# Patient Record
Sex: FEMALE | Race: WHITE | NOT HISPANIC OR LATINO | URBAN - METROPOLITAN AREA
[De-identification: names, ages, dates, MRNs, and addresses within clinical notes are randomized per-mention and may not be internally consistent; named-entity substitution may affect disease eponyms.]

---

## 2017-09-29 ENCOUNTER — OUTPATIENT (OUTPATIENT)
Dept: INPATIENT UNIT | Facility: HOSPITAL | Age: 32
LOS: 1 days | End: 2017-09-29
Payer: COMMERCIAL

## 2017-09-29 DIAGNOSIS — O47.03 FALSE LABOR BEFORE 37 COMPLETED WEEKS OF GESTATION, THIRD TRIMESTER: ICD-10-CM

## 2017-09-29 LAB
ALBUMIN SERPL ELPH-MCNC: 3.6 G/DL — SIGNIFICANT CHANGE UP (ref 3.3–5.2)
ALP SERPL-CCNC: 80 U/L — SIGNIFICANT CHANGE UP (ref 40–120)
ALT FLD-CCNC: 12 U/L — SIGNIFICANT CHANGE UP
ANION GAP SERPL CALC-SCNC: 16 MMOL/L — SIGNIFICANT CHANGE UP (ref 5–17)
APPEARANCE UR: CLEAR — SIGNIFICANT CHANGE UP
APTT BLD: 26.5 SEC — LOW (ref 27.5–37.4)
AST SERPL-CCNC: 17 U/L — SIGNIFICANT CHANGE UP
BASOPHILS # BLD AUTO: 0 K/UL — SIGNIFICANT CHANGE UP (ref 0–0.2)
BASOPHILS NFR BLD AUTO: 0.2 % — SIGNIFICANT CHANGE UP (ref 0–2)
BILIRUB SERPL-MCNC: 0.1 MG/DL — LOW (ref 0.4–2)
BILIRUB UR-MCNC: NEGATIVE — SIGNIFICANT CHANGE UP
BUN SERPL-MCNC: 10 MG/DL — SIGNIFICANT CHANGE UP (ref 8–20)
CALCIUM SERPL-MCNC: 8.5 MG/DL — LOW (ref 8.6–10.2)
CHLORIDE SERPL-SCNC: 102 MMOL/L — SIGNIFICANT CHANGE UP (ref 98–107)
CO2 SERPL-SCNC: 21 MMOL/L — LOW (ref 22–29)
COLOR SPEC: YELLOW — SIGNIFICANT CHANGE UP
CREAT SERPL-MCNC: 0.83 MG/DL — SIGNIFICANT CHANGE UP (ref 0.5–1.3)
DIFF PNL FLD: NEGATIVE — SIGNIFICANT CHANGE UP
EOSINOPHIL # BLD AUTO: 0.1 K/UL — SIGNIFICANT CHANGE UP (ref 0–0.5)
EOSINOPHIL NFR BLD AUTO: 1 % — SIGNIFICANT CHANGE UP (ref 0–6)
EPI CELLS # UR: SIGNIFICANT CHANGE UP
FIBRINOGEN PPP-MCNC: 695 MG/DL — HIGH (ref 310–510)
GLUCOSE SERPL-MCNC: 120 MG/DL — HIGH (ref 70–115)
GLUCOSE UR QL: 50 MG/DL
HCT VFR BLD CALC: 30.3 % — LOW (ref 37–47)
HGB BLD-MCNC: 10.5 G/DL — LOW (ref 12–16)
INR BLD: 0.91 RATIO — SIGNIFICANT CHANGE UP (ref 0.88–1.16)
KETONES UR-MCNC: ABNORMAL
LEUKOCYTE ESTERASE UR-ACNC: ABNORMAL
LYMPHOCYTES # BLD AUTO: 1.9 K/UL — SIGNIFICANT CHANGE UP (ref 1–4.8)
LYMPHOCYTES # BLD AUTO: 17.2 % — LOW (ref 20–55)
MCHC RBC-ENTMCNC: 30.4 PG — SIGNIFICANT CHANGE UP (ref 27–31)
MCHC RBC-ENTMCNC: 34.7 G/DL — SIGNIFICANT CHANGE UP (ref 32–36)
MCV RBC AUTO: 87.8 FL — SIGNIFICANT CHANGE UP (ref 81–99)
MONOCYTES # BLD AUTO: 0.9 K/UL — HIGH (ref 0–0.8)
MONOCYTES NFR BLD AUTO: 8.1 % — SIGNIFICANT CHANGE UP (ref 3–10)
NEUTROPHILS # BLD AUTO: 7.9 K/UL — SIGNIFICANT CHANGE UP (ref 1.8–8)
NEUTROPHILS NFR BLD AUTO: 72.9 % — SIGNIFICANT CHANGE UP (ref 37–73)
NITRITE UR-MCNC: NEGATIVE — SIGNIFICANT CHANGE UP
PH UR: 6 — SIGNIFICANT CHANGE UP (ref 5–8)
PLATELET # BLD AUTO: 208 K/UL — SIGNIFICANT CHANGE UP (ref 150–400)
POTASSIUM SERPL-MCNC: 3.5 MMOL/L — SIGNIFICANT CHANGE UP (ref 3.5–5.3)
POTASSIUM SERPL-SCNC: 3.5 MMOL/L — SIGNIFICANT CHANGE UP (ref 3.5–5.3)
PROT SERPL-MCNC: 6.6 G/DL — SIGNIFICANT CHANGE UP (ref 6.6–8.7)
PROT UR-MCNC: 30 MG/DL
PROTHROM AB SERPL-ACNC: 10 SEC — SIGNIFICANT CHANGE UP (ref 9.8–12.7)
RBC # BLD: 3.45 M/UL — LOW (ref 4.4–5.2)
RBC # FLD: 13.3 % — SIGNIFICANT CHANGE UP (ref 11–15.6)
RBC CASTS # UR COMP ASSIST: NEGATIVE /HPF — SIGNIFICANT CHANGE UP (ref 0–4)
SODIUM SERPL-SCNC: 139 MMOL/L — SIGNIFICANT CHANGE UP (ref 135–145)
SP GR SPEC: 1.02 — SIGNIFICANT CHANGE UP (ref 1.01–1.02)
URATE SERPL-MCNC: 4.6 MG/DL — SIGNIFICANT CHANGE UP (ref 2.4–5.7)
UROBILINOGEN FLD QL: 1 MG/DL
WBC # BLD: 10.9 K/UL — HIGH (ref 4.8–10.8)
WBC # FLD AUTO: 10.9 K/UL — HIGH (ref 4.8–10.8)
WBC UR QL: SIGNIFICANT CHANGE UP

## 2017-09-29 PROCEDURE — 85730 THROMBOPLASTIN TIME PARTIAL: CPT

## 2017-09-29 PROCEDURE — 81001 URINALYSIS AUTO W/SCOPE: CPT

## 2017-09-29 PROCEDURE — 85027 COMPLETE CBC AUTOMATED: CPT

## 2017-09-29 PROCEDURE — 85610 PROTHROMBIN TIME: CPT

## 2017-09-29 PROCEDURE — 85384 FIBRINOGEN ACTIVITY: CPT

## 2017-09-29 PROCEDURE — 59025 FETAL NON-STRESS TEST: CPT

## 2017-09-29 PROCEDURE — G0463: CPT

## 2017-09-29 PROCEDURE — 36415 COLL VENOUS BLD VENIPUNCTURE: CPT

## 2017-09-29 PROCEDURE — 80053 COMPREHEN METABOLIC PANEL: CPT

## 2017-09-29 PROCEDURE — 84550 ASSAY OF BLOOD/URIC ACID: CPT

## 2017-09-29 RX ORDER — SODIUM CHLORIDE 9 MG/ML
500 INJECTION, SOLUTION INTRAVENOUS ONCE
Qty: 0 | Refills: 0 | Status: COMPLETED | OUTPATIENT
Start: 2017-09-29 | End: 2017-09-29

## 2017-09-29 RX ADMIN — SODIUM CHLORIDE 1000 MILLILITER(S): 9 INJECTION, SOLUTION INTRAVENOUS at 20:07

## 2017-11-24 ENCOUNTER — INPATIENT (INPATIENT)
Facility: HOSPITAL | Age: 32
LOS: 3 days | Discharge: HOME | End: 2017-11-28
Payer: COMMERCIAL

## 2017-11-24 ENCOUNTER — TRANSCRIPTION ENCOUNTER (OUTPATIENT)
Age: 32
End: 2017-11-24

## 2017-11-24 VITALS — HEIGHT: 66 IN | WEIGHT: 180.78 LBS

## 2017-11-24 LAB
ABO RH CONFIRMATION: SIGNIFICANT CHANGE UP
APPEARANCE UR: CLEAR — SIGNIFICANT CHANGE UP
BASOPHILS # BLD AUTO: 0 K/UL — SIGNIFICANT CHANGE UP (ref 0–0.2)
BASOPHILS NFR BLD AUTO: 0.2 % — SIGNIFICANT CHANGE UP (ref 0–2)
BILIRUB UR-MCNC: NEGATIVE — SIGNIFICANT CHANGE UP
BLD GP AB SCN SERPL QL: SIGNIFICANT CHANGE UP
COLOR SPEC: YELLOW — SIGNIFICANT CHANGE UP
DIFF PNL FLD: NEGATIVE — SIGNIFICANT CHANGE UP
EOSINOPHIL # BLD AUTO: 0.1 K/UL — SIGNIFICANT CHANGE UP (ref 0–0.5)
EOSINOPHIL NFR BLD AUTO: 1.2 % — SIGNIFICANT CHANGE UP (ref 0–6)
GLUCOSE UR QL: NEGATIVE MG/DL — SIGNIFICANT CHANGE UP
HCT VFR BLD CALC: 33.2 % — LOW (ref 37–47)
HGB BLD-MCNC: 11.5 G/DL — LOW (ref 12–16)
KETONES UR-MCNC: NEGATIVE — SIGNIFICANT CHANGE UP
LEUKOCYTE ESTERASE UR-ACNC: NEGATIVE — SIGNIFICANT CHANGE UP
LYMPHOCYTES # BLD AUTO: 1.8 K/UL — SIGNIFICANT CHANGE UP (ref 1–4.8)
LYMPHOCYTES # BLD AUTO: 20.2 % — SIGNIFICANT CHANGE UP (ref 20–55)
MCHC RBC-ENTMCNC: 30.6 PG — SIGNIFICANT CHANGE UP (ref 27–31)
MCHC RBC-ENTMCNC: 34.6 G/DL — SIGNIFICANT CHANGE UP (ref 32–36)
MCV RBC AUTO: 88.3 FL — SIGNIFICANT CHANGE UP (ref 81–99)
MONOCYTES # BLD AUTO: 0.6 K/UL — SIGNIFICANT CHANGE UP (ref 0–0.8)
MONOCYTES NFR BLD AUTO: 7.1 % — SIGNIFICANT CHANGE UP (ref 3–10)
NEUTROPHILS # BLD AUTO: 6.3 K/UL — SIGNIFICANT CHANGE UP (ref 1.8–8)
NEUTROPHILS NFR BLD AUTO: 71 % — SIGNIFICANT CHANGE UP (ref 37–73)
NITRITE UR-MCNC: NEGATIVE — SIGNIFICANT CHANGE UP
PH UR: 7 — SIGNIFICANT CHANGE UP (ref 5–8)
PLATELET # BLD AUTO: 203 K/UL — SIGNIFICANT CHANGE UP (ref 150–400)
PROT UR-MCNC: NEGATIVE MG/DL — SIGNIFICANT CHANGE UP
RBC # BLD: 3.76 M/UL — LOW (ref 4.4–5.2)
RBC # FLD: 13.1 % — SIGNIFICANT CHANGE UP (ref 11–15.6)
SP GR SPEC: 1 — LOW (ref 1.01–1.02)
TYPE + AB SCN PNL BLD: SIGNIFICANT CHANGE UP
UROBILINOGEN FLD QL: NEGATIVE MG/DL — SIGNIFICANT CHANGE UP
WBC # BLD: 8.9 K/UL — SIGNIFICANT CHANGE UP (ref 4.8–10.8)
WBC # FLD AUTO: 8.9 K/UL — SIGNIFICANT CHANGE UP (ref 4.8–10.8)

## 2017-11-24 RX ORDER — SODIUM CHLORIDE 9 MG/ML
1000 INJECTION, SOLUTION INTRAVENOUS
Qty: 0 | Refills: 0 | Status: DISCONTINUED | OUTPATIENT
Start: 2017-11-24 | End: 2017-11-25

## 2017-11-24 RX ORDER — OXYTOCIN 10 UNIT/ML
333.33 VIAL (ML) INJECTION
Qty: 20 | Refills: 0 | Status: DISCONTINUED | OUTPATIENT
Start: 2017-11-24 | End: 2017-11-25

## 2017-11-24 RX ORDER — CITRIC ACID/SODIUM CITRATE 300-500 MG
30 SOLUTION, ORAL ORAL ONCE
Qty: 0 | Refills: 0 | Status: COMPLETED | OUTPATIENT
Start: 2017-11-24 | End: 2017-11-25

## 2017-11-24 RX ORDER — INFLUENZA VIRUS VACCINE 15; 15; 15; 15 UG/.5ML; UG/.5ML; UG/.5ML; UG/.5ML
0.5 SUSPENSION INTRAMUSCULAR ONCE
Qty: 0 | Refills: 0 | Status: COMPLETED | OUTPATIENT
Start: 2017-11-24 | End: 2017-11-27

## 2017-11-24 RX ORDER — MEPERIDINE HYDROCHLORIDE 50 MG/ML
75 INJECTION INTRAMUSCULAR; INTRAVENOUS; SUBCUTANEOUS EVERY 4 HOURS
Qty: 0 | Refills: 0 | Status: DISCONTINUED | OUTPATIENT
Start: 2017-11-24 | End: 2017-11-25

## 2017-11-24 RX ORDER — SODIUM CHLORIDE 9 MG/ML
1500 INJECTION, SOLUTION INTRAVENOUS ONCE
Qty: 0 | Refills: 0 | Status: COMPLETED | OUTPATIENT
Start: 2017-11-24 | End: 2017-11-25

## 2017-11-24 RX ADMIN — SODIUM CHLORIDE 125 MILLILITER(S): 9 INJECTION, SOLUTION INTRAVENOUS at 18:20

## 2017-11-25 ENCOUNTER — TRANSCRIPTION ENCOUNTER (OUTPATIENT)
Age: 32
End: 2017-11-25

## 2017-11-25 LAB — DIR ANTIGLOB POLYSPECIFIC INTERPRETATION: SIGNIFICANT CHANGE UP

## 2017-11-25 RX ORDER — SODIUM CHLORIDE 9 MG/ML
1000 INJECTION, SOLUTION INTRAVENOUS
Qty: 0 | Refills: 0 | Status: DISCONTINUED | OUTPATIENT
Start: 2017-11-25 | End: 2017-11-28

## 2017-11-25 RX ORDER — OXYCODONE AND ACETAMINOPHEN 5; 325 MG/1; MG/1
1 TABLET ORAL EVERY 4 HOURS
Qty: 0 | Refills: 0 | Status: DISCONTINUED | OUTPATIENT
Start: 2017-11-25 | End: 2017-11-28

## 2017-11-25 RX ORDER — OXYCODONE HYDROCHLORIDE 5 MG/1
5 TABLET ORAL
Qty: 0 | Refills: 0 | Status: DISCONTINUED | OUTPATIENT
Start: 2017-11-25 | End: 2017-11-28

## 2017-11-25 RX ORDER — ONDANSETRON 8 MG/1
4 TABLET, FILM COATED ORAL EVERY 6 HOURS
Qty: 0 | Refills: 0 | Status: DISCONTINUED | OUTPATIENT
Start: 2017-11-25 | End: 2017-11-28

## 2017-11-25 RX ORDER — DIPHENHYDRAMINE HCL 50 MG
25 CAPSULE ORAL EVERY 6 HOURS
Qty: 0 | Refills: 0 | Status: DISCONTINUED | OUTPATIENT
Start: 2017-11-25 | End: 2017-11-28

## 2017-11-25 RX ORDER — DIPHENHYDRAMINE HCL 50 MG
50 CAPSULE ORAL EVERY 4 HOURS
Qty: 0 | Refills: 0 | Status: DISCONTINUED | OUTPATIENT
Start: 2017-11-25 | End: 2017-11-28

## 2017-11-25 RX ORDER — KETOROLAC TROMETHAMINE 30 MG/ML
30 SYRINGE (ML) INJECTION ONCE
Qty: 0 | Refills: 0 | Status: DISCONTINUED | OUTPATIENT
Start: 2017-11-25 | End: 2017-11-28

## 2017-11-25 RX ORDER — LANOLIN
1 OINTMENT (GRAM) TOPICAL
Qty: 0 | Refills: 0 | Status: DISCONTINUED | OUTPATIENT
Start: 2017-11-25 | End: 2017-11-28

## 2017-11-25 RX ORDER — KETOROLAC TROMETHAMINE 30 MG/ML
30 SYRINGE (ML) INJECTION EVERY 6 HOURS
Qty: 0 | Refills: 0 | Status: DISCONTINUED | OUTPATIENT
Start: 2017-11-25 | End: 2017-11-28

## 2017-11-25 RX ORDER — NALOXONE HYDROCHLORIDE 4 MG/.1ML
0.1 SPRAY NASAL
Qty: 0 | Refills: 0 | Status: DISCONTINUED | OUTPATIENT
Start: 2017-11-25 | End: 2017-11-28

## 2017-11-25 RX ORDER — SIMETHICONE 80 MG/1
80 TABLET, CHEWABLE ORAL
Qty: 0 | Refills: 0 | Status: DISCONTINUED | OUTPATIENT
Start: 2017-11-25 | End: 2017-11-28

## 2017-11-25 RX ORDER — OXYTOCIN 10 UNIT/ML
41.67 VIAL (ML) INJECTION
Qty: 20 | Refills: 0 | Status: DISCONTINUED | OUTPATIENT
Start: 2017-11-25 | End: 2017-11-28

## 2017-11-25 RX ORDER — FERROUS SULFATE 325(65) MG
325 TABLET ORAL DAILY
Qty: 0 | Refills: 0 | Status: DISCONTINUED | OUTPATIENT
Start: 2017-11-25 | End: 2017-11-28

## 2017-11-25 RX ORDER — ACETAMINOPHEN 500 MG
1000 TABLET ORAL ONCE
Qty: 0 | Refills: 0 | Status: DISCONTINUED | OUTPATIENT
Start: 2017-11-25 | End: 2017-11-28

## 2017-11-25 RX ORDER — OXYCODONE HYDROCHLORIDE 5 MG/1
10 TABLET ORAL
Qty: 0 | Refills: 0 | Status: DISCONTINUED | OUTPATIENT
Start: 2017-11-25 | End: 2017-11-28

## 2017-11-25 RX ORDER — CEFAZOLIN SODIUM 1 G
2000 VIAL (EA) INJECTION ONCE
Qty: 0 | Refills: 0 | Status: COMPLETED | OUTPATIENT
Start: 2017-11-25 | End: 2017-11-25

## 2017-11-25 RX ORDER — OXYTOCIN 10 UNIT/ML
41.67 VIAL (ML) INJECTION
Qty: 20 | Refills: 0 | Status: DISCONTINUED | OUTPATIENT
Start: 2017-11-25 | End: 2017-11-25

## 2017-11-25 RX ORDER — DOCUSATE SODIUM 100 MG
100 CAPSULE ORAL
Qty: 0 | Refills: 0 | Status: DISCONTINUED | OUTPATIENT
Start: 2017-11-25 | End: 2017-11-28

## 2017-11-25 RX ORDER — OXYTOCIN 10 UNIT/ML
2 VIAL (ML) INJECTION
Qty: 30 | Refills: 0 | Status: DISCONTINUED | OUTPATIENT
Start: 2017-11-25 | End: 2017-11-28

## 2017-11-25 RX ORDER — TETANUS TOXOID, REDUCED DIPHTHERIA TOXOID AND ACELLULAR PERTUSSIS VACCINE, ADSORBED 5; 2.5; 8; 8; 2.5 [IU]/.5ML; [IU]/.5ML; UG/.5ML; UG/.5ML; UG/.5ML
0.5 SUSPENSION INTRAMUSCULAR ONCE
Qty: 0 | Refills: 0 | Status: COMPLETED | OUTPATIENT
Start: 2017-11-25 | End: 2018-10-24

## 2017-11-25 RX ORDER — OXYCODONE AND ACETAMINOPHEN 5; 325 MG/1; MG/1
2 TABLET ORAL EVERY 6 HOURS
Qty: 0 | Refills: 0 | Status: DISCONTINUED | OUTPATIENT
Start: 2017-11-25 | End: 2017-11-28

## 2017-11-25 RX ORDER — CEFAZOLIN SODIUM 1 G
2000 VIAL (EA) INJECTION EVERY 8 HOURS
Qty: 0 | Refills: 0 | Status: COMPLETED | OUTPATIENT
Start: 2017-11-25 | End: 2017-11-26

## 2017-11-25 RX ORDER — HYDROMORPHONE HYDROCHLORIDE 2 MG/ML
1 INJECTION INTRAMUSCULAR; INTRAVENOUS; SUBCUTANEOUS
Qty: 0 | Refills: 0 | Status: DISCONTINUED | OUTPATIENT
Start: 2017-11-25 | End: 2017-11-28

## 2017-11-25 RX ORDER — DIPHENHYDRAMINE HCL 50 MG
50 CAPSULE ORAL ONCE
Qty: 0 | Refills: 0 | Status: DISCONTINUED | OUTPATIENT
Start: 2017-11-25 | End: 2017-11-28

## 2017-11-25 RX ORDER — KETOROLAC TROMETHAMINE 30 MG/ML
30 SYRINGE (ML) INJECTION EVERY 6 HOURS
Qty: 0 | Refills: 0 | Status: DISCONTINUED | OUTPATIENT
Start: 2017-11-25 | End: 2017-11-26

## 2017-11-25 RX ORDER — OXYTOCIN 10 UNIT/ML
333.33 VIAL (ML) INJECTION
Qty: 20 | Refills: 0 | Status: COMPLETED | OUTPATIENT
Start: 2017-11-25 | End: 2017-11-25

## 2017-11-25 RX ORDER — ACETAMINOPHEN 500 MG
650 TABLET ORAL EVERY 6 HOURS
Qty: 0 | Refills: 0 | Status: DISCONTINUED | OUTPATIENT
Start: 2017-11-25 | End: 2017-11-28

## 2017-11-25 RX ORDER — ONDANSETRON 8 MG/1
4 TABLET, FILM COATED ORAL ONCE
Qty: 0 | Refills: 0 | Status: DISCONTINUED | OUTPATIENT
Start: 2017-11-25 | End: 2017-11-28

## 2017-11-25 RX ADMIN — Medication 100 MILLIGRAM(S): at 15:30

## 2017-11-25 RX ADMIN — Medication 1000 MILLIUNIT(S)/MIN: at 15:51

## 2017-11-25 RX ADMIN — Medication 30 MILLIGRAM(S): at 20:57

## 2017-11-25 RX ADMIN — Medication 2 MILLIUNIT(S)/MIN: at 08:55

## 2017-11-25 RX ADMIN — Medication 100 MILLIGRAM(S): at 22:59

## 2017-11-25 RX ADMIN — SODIUM CHLORIDE 125 MILLILITER(S): 9 INJECTION, SOLUTION INTRAVENOUS at 23:00

## 2017-11-25 RX ADMIN — Medication 30 MILLILITER(S): at 11:52

## 2017-11-25 RX ADMIN — Medication 1000 MILLIUNIT(S)/MIN: at 20:57

## 2017-11-25 RX ADMIN — SODIUM CHLORIDE 3000 MILLILITER(S): 9 INJECTION, SOLUTION INTRAVENOUS at 11:40

## 2017-11-25 RX ADMIN — Medication 30 MILLIGRAM(S): at 21:27

## 2017-11-25 NOTE — DISCHARGE NOTE OB - CARE PLAN
Principal Discharge DX:	 delivery delivered  Goal:	full recovery  Instructions for follow-up, activity and diet:	F/U in office at end of week for staple removal, reg diet, pelvic rest

## 2017-11-25 NOTE — DISCHARGE NOTE OB - PATIENT PORTAL LINK FT
“You can access the FollowHealth Patient Portal, offered by [facility], by registering with the following website: [email or web address]”

## 2017-11-25 NOTE — DISCHARGE NOTE OB - MATERIALS PROVIDED
Shaken Baby Prevention Handout/Breastfeeding Guide and Packet/Breastfeeding Log/Tdap Vaccination (VIS Pub Date: January 24, 2012)/Vaccinations/[place] Hearing Screen Program/Birth Certificate Instructions/Breastfeeding Mother’s Support Group Information/Guide to Postpartum Care

## 2017-11-25 NOTE — DISCHARGE NOTE OB - HOSPITAL COURSE
pt was admitted for 41 wk induction and did not progress with cervical dilation despite several hours of regular, painful contractions - primary C/S performed.  C/S was uncomplicated as well as post op course - pt cleared for D/C home POD#3

## 2017-11-25 NOTE — DISCHARGE NOTE OB - CARE PROVIDER_API CALL
Ryanne Sung), Obstetrics and Gynecology  [address] [place] [address]  Phone: [phone]  Fax: [phone]

## 2017-11-25 NOTE — DISCHARGE NOTE OB - MEDICATION SUMMARY - MEDICATIONS TO TAKE
I will START or STAY ON the medications listed below when I get home from the hospital:     mg oral tablet  -- 1 tab(s) by mouth every 6 hours, As Needed -for moderate pain MDD:4 tabs  -- Do not take this drug if you are pregnant.  It is very important that you take or use this exactly as directed.  Do not skip doses or discontinue unless directed by your doctor.  May cause drowsiness or dizziness.  Obtain medical advice before taking any non-prescription drugs as some may affect the action of this medication.  Take with food or milk.    -- Indication: For prn moderate pain    Percocet 5/325 oral tablet  -- 1 tab(s) by mouth every 6 hours, As Needed -for severe pain MDD:4 tabs   -- Caution federal law prohibits the transfer of this drug to any person other  than the person for whom it was prescribed.  May cause drowsiness.  Alcohol may intensify this effect.  Use care when operating dangerous machinery.  This prescription cannot be refilled.  This product contains acetaminophen.  Do not use  with any other product containing acetaminophen to prevent possible liver damage.  Using more of this medication than prescribed may cause serious breathing problems.    -- Indication: For prn severe pain

## 2017-11-26 LAB
BASOPHILS # BLD AUTO: 0 K/UL — SIGNIFICANT CHANGE UP (ref 0–0.2)
BASOPHILS NFR BLD AUTO: 0.1 % — SIGNIFICANT CHANGE UP (ref 0–2)
EOSINOPHIL # BLD AUTO: 0 K/UL — SIGNIFICANT CHANGE UP (ref 0–0.5)
EOSINOPHIL NFR BLD AUTO: 0 % — SIGNIFICANT CHANGE UP (ref 0–6)
HCT VFR BLD CALC: 24.7 % — LOW (ref 37–47)
HGB BLD-MCNC: 8.6 G/DL — LOW (ref 12–16)
LYMPHOCYTES # BLD AUTO: 1.2 K/UL — SIGNIFICANT CHANGE UP (ref 1–4.8)
LYMPHOCYTES # BLD AUTO: 8.3 % — LOW (ref 20–55)
MCHC RBC-ENTMCNC: 30.6 PG — SIGNIFICANT CHANGE UP (ref 27–31)
MCHC RBC-ENTMCNC: 34.8 G/DL — SIGNIFICANT CHANGE UP (ref 32–36)
MCV RBC AUTO: 87.9 FL — SIGNIFICANT CHANGE UP (ref 81–99)
MONOCYTES # BLD AUTO: 1.4 K/UL — HIGH (ref 0–0.8)
MONOCYTES NFR BLD AUTO: 9.2 % — SIGNIFICANT CHANGE UP (ref 3–10)
NEUTROPHILS # BLD AUTO: 12.3 K/UL — HIGH (ref 1.8–8)
NEUTROPHILS NFR BLD AUTO: 82.2 % — HIGH (ref 37–73)
PLATELET # BLD AUTO: 156 K/UL — SIGNIFICANT CHANGE UP (ref 150–400)
RBC # BLD: 2.81 M/UL — LOW (ref 4.4–5.2)
RBC # FLD: 12.9 % — SIGNIFICANT CHANGE UP (ref 11–15.6)
T PALLIDUM AB TITR SER: NEGATIVE — SIGNIFICANT CHANGE UP
WBC # BLD: 15 K/UL — HIGH (ref 4.8–10.8)
WBC # FLD AUTO: 15 K/UL — HIGH (ref 4.8–10.8)

## 2017-11-26 RX ORDER — IBUPROFEN 200 MG
600 TABLET ORAL EVERY 6 HOURS
Qty: 0 | Refills: 0 | Status: DISCONTINUED | OUTPATIENT
Start: 2017-11-26 | End: 2017-11-28

## 2017-11-26 RX ADMIN — SIMETHICONE 80 MILLIGRAM(S): 80 TABLET, CHEWABLE ORAL at 06:46

## 2017-11-26 RX ADMIN — Medication 30 MILLIGRAM(S): at 15:26

## 2017-11-26 RX ADMIN — Medication 30 MILLIGRAM(S): at 09:27

## 2017-11-26 RX ADMIN — SIMETHICONE 80 MILLIGRAM(S): 80 TABLET, CHEWABLE ORAL at 17:30

## 2017-11-26 RX ADMIN — Medication 100 MILLIGRAM(S): at 17:30

## 2017-11-26 RX ADMIN — Medication 100 MILLIGRAM(S): at 06:27

## 2017-11-26 RX ADMIN — Medication 30 MILLIGRAM(S): at 03:16

## 2017-11-26 RX ADMIN — Medication 30 MILLIGRAM(S): at 09:12

## 2017-11-26 RX ADMIN — Medication 325 MILLIGRAM(S): at 09:13

## 2017-11-26 RX ADMIN — Medication 30 MILLIGRAM(S): at 15:11

## 2017-11-26 RX ADMIN — SODIUM CHLORIDE 125 MILLILITER(S): 9 INJECTION, SOLUTION INTRAVENOUS at 06:27

## 2017-11-26 RX ADMIN — Medication 1 TABLET(S): at 09:13

## 2017-11-26 RX ADMIN — Medication 30 MILLIGRAM(S): at 03:46

## 2017-11-26 NOTE — PROGRESS NOTE ADULT - SUBJECTIVE AND OBJECTIVE BOX
Section, POD#1    Pt is now POD#1 S/P  Section due to failure to progress/arrest of dilatation doing well.    She is sitting up in bed, tolerating a reg diet, and denies N/V.  No C/O dizziness, no SELWYN.  +Breast feeding without difficulty.  On exam, pt appears well.  VSS, afebrile.    Vital Signs Last 24 Hrs  T(C): 36.9 (2017 03:00), Max: 36.9 (2017 16:30)  T(F): 98.5 (2017 03:00), Max: 98.5 (2017 03:00)  HR: 76 (2017 03:00) (59 - 103)  BP: 138/80 (2017 03:00) (106/38 - 140/63)  RR: 18 (2017 03:00) (16 - 24)  SpO2: 96% (2017 18:45) (96% - 100%)    Breasts soft, nontender, nonengorged.  Abdomen: Soft, nontender, nondistended , firm uterine fundus.  Incision clean, dry dressing  Pelvic: Normal lochia noted  Ext: No DVT tenderness    LABS:                        11.5   8.9   )-----------( 203      ( 2017 18:43 )             33.2       POD#1 S/P primary  stable.  Pt recovering well.  Signs and symptoms normal /  abnormal post op courses reviewed.  Plan to cont reg diet, OOB,  pain management as per anesthesia at this time.  Will change to Motrin/Percocet prn today.

## 2017-11-26 NOTE — PROGRESS NOTE ADULT - SUBJECTIVE AND OBJECTIVE BOX
INTERVAL HPI/OVERNIGHT EVENTS:  32y Female s/p c section under epidural anesthesia with duramorph for post op analgesia on     Vital Signs Last 24 Hrs  T(C): 36.8 (26 Nov 2017 08:13), Max: 36.9 (25 Nov 2017 16:30)  T(F): 98.2 (26 Nov 2017 08:13), Max: 98.5 (26 Nov 2017 03:00)  HR: 82 (26 Nov 2017 08:13) (59 - 103)  BP: 118/70 (26 Nov 2017 08:13) (106/38 - 140/63)  BP(mean): --  RR: 18 (26 Nov 2017 08:13) (16 - 24)  SpO2: 96% (25 Nov 2017 18:45) (96% - 100%)    Patient seen, doing well, no anesthetic complications or complaints noted or reported.  Pain is controlled.

## 2017-11-27 RX ORDER — TETANUS TOXOID, REDUCED DIPHTHERIA TOXOID AND ACELLULAR PERTUSSIS VACCINE, ADSORBED 5; 2.5; 8; 8; 2.5 [IU]/.5ML; [IU]/.5ML; UG/.5ML; UG/.5ML; UG/.5ML
0.5 SUSPENSION INTRAMUSCULAR ONCE
Qty: 0 | Refills: 0 | Status: COMPLETED | OUTPATIENT
Start: 2017-11-27 | End: 2017-11-27

## 2017-11-27 RX ADMIN — Medication 600 MILLIGRAM(S): at 18:18

## 2017-11-27 RX ADMIN — Medication 600 MILLIGRAM(S): at 18:51

## 2017-11-27 RX ADMIN — Medication 1 TABLET(S): at 12:22

## 2017-11-27 RX ADMIN — Medication 600 MILLIGRAM(S): at 23:57

## 2017-11-27 RX ADMIN — Medication 325 MILLIGRAM(S): at 12:22

## 2017-11-27 RX ADMIN — Medication 600 MILLIGRAM(S): at 02:16

## 2017-11-27 RX ADMIN — Medication 600 MILLIGRAM(S): at 01:16

## 2017-11-27 RX ADMIN — SIMETHICONE 80 MILLIGRAM(S): 80 TABLET, CHEWABLE ORAL at 06:07

## 2017-11-27 RX ADMIN — Medication 100 MILLIGRAM(S): at 18:18

## 2017-11-27 RX ADMIN — Medication 600 MILLIGRAM(S): at 06:35

## 2017-11-27 RX ADMIN — Medication 100 MILLIGRAM(S): at 06:06

## 2017-11-27 RX ADMIN — SIMETHICONE 80 MILLIGRAM(S): 80 TABLET, CHEWABLE ORAL at 18:18

## 2017-11-27 RX ADMIN — Medication 600 MILLIGRAM(S): at 12:22

## 2017-11-27 RX ADMIN — INFLUENZA VIRUS VACCINE 0.5 MILLILITER(S): 15; 15; 15; 15 SUSPENSION INTRAMUSCULAR at 06:33

## 2017-11-27 RX ADMIN — Medication 600 MILLIGRAM(S): at 13:00

## 2017-11-27 NOTE — PROGRESS NOTE ADULT - SUBJECTIVE AND OBJECTIVE BOX
Section, POD#2    Pt is now POD#2 S/P  Section doing well.    She is ambulating without difficulty, tolerating a reg diet, and denies N/V.  No C/O dizziness, no SELWYN.  +Voiding without difficulty.  +Breast feeding    On exam, pt appears well.  VSS, afebrile.    Vital Signs Last 24 Hrs  T(C): 36.9 (2017 07:43), Max: 36.9 (2017 07:43)  T(F): 98.4 (2017 07:43), Max: 98.4 (2017 07:43)  HR: 78 (2017 07:43) (78 - 91)  BP: 135/84 (2017 07:43) (125/85 - 135/84)  RR: 18 (2017 07:43) (18 - 18)    Breasts soft, nontender, nonengorged.  Abdomen: Soft, nontender, nondistended , firm uterine fundus.  Incision clean, dry, intact with staples.  Pelvic: Normal lochia noted  Ext: No DVT tenderness    LABS:                        8.6    15.0  )-----------( 156      ( 2017 07:25 )             24.7         POD#2 S/P primary  stable.  Pt recovering well.  Signs and symptoms normal /  abnormal post op courses reviewed.  Plan to cont reg diet, OOB.  Possible D/C tomorrow am.

## 2017-11-28 VITALS
RESPIRATION RATE: 18 BRPM | TEMPERATURE: 98 F | DIASTOLIC BLOOD PRESSURE: 75 MMHG | HEART RATE: 79 BPM | SYSTOLIC BLOOD PRESSURE: 127 MMHG

## 2017-11-28 PROCEDURE — 90715 TDAP VACCINE 7 YRS/> IM: CPT

## 2017-11-28 PROCEDURE — 90686 IIV4 VACC NO PRSV 0.5 ML IM: CPT

## 2017-11-28 PROCEDURE — 85027 COMPLETE CBC AUTOMATED: CPT

## 2017-11-28 PROCEDURE — 86880 COOMBS TEST DIRECT: CPT

## 2017-11-28 PROCEDURE — 86900 BLOOD TYPING SEROLOGIC ABO: CPT

## 2017-11-28 PROCEDURE — 36415 COLL VENOUS BLD VENIPUNCTURE: CPT

## 2017-11-28 PROCEDURE — 86901 BLOOD TYPING SEROLOGIC RH(D): CPT

## 2017-11-28 PROCEDURE — 90707 MMR VACCINE SC: CPT

## 2017-11-28 PROCEDURE — 86780 TREPONEMA PALLIDUM: CPT

## 2017-11-28 PROCEDURE — 81003 URINALYSIS AUTO W/O SCOPE: CPT

## 2017-11-28 PROCEDURE — 86850 RBC ANTIBODY SCREEN: CPT

## 2017-11-28 RX ORDER — IBUPROFEN 200 MG
1 TABLET ORAL
Qty: 30 | Refills: 0
Start: 2017-11-28

## 2017-11-28 RX ADMIN — Medication 600 MILLIGRAM(S): at 00:30

## 2017-11-28 RX ADMIN — Medication 325 MILLIGRAM(S): at 12:01

## 2017-11-28 RX ADMIN — SIMETHICONE 80 MILLIGRAM(S): 80 TABLET, CHEWABLE ORAL at 06:33

## 2017-11-28 RX ADMIN — Medication 600 MILLIGRAM(S): at 07:17

## 2017-11-28 RX ADMIN — TETANUS TOXOID, REDUCED DIPHTHERIA TOXOID AND ACELLULAR PERTUSSIS VACCINE, ADSORBED 0.5 MILLILITER(S): 5; 2.5; 8; 8; 2.5 SUSPENSION INTRAMUSCULAR at 06:33

## 2017-11-28 RX ADMIN — Medication 1 TABLET(S): at 12:01

## 2017-11-28 RX ADMIN — Medication 600 MILLIGRAM(S): at 06:33

## 2017-11-28 RX ADMIN — Medication 600 MILLIGRAM(S): at 12:01

## 2017-11-28 RX ADMIN — Medication 0.5 MILLILITER(S): at 12:57

## 2017-11-28 RX ADMIN — Medication 100 MILLIGRAM(S): at 06:33

## 2017-11-28 NOTE — PROGRESS NOTE ADULT - SUBJECTIVE AND OBJECTIVE BOX
Section, POD#3    Pt is now POD#3 S/P primary  Section doing well.    She is ambulating without difficulty, tolerating a reg diet, and denies N/V.  No C/O dizziness, no SELWYN.  +Voiding without difficulty.  +Breast feeding.      On exam, pt appears well.  VSS, afebrile.    Vital Signs Last 24 Hrs  T(C): 36.8 (2017 19:15), Max: 36.8 (2017 19:15)  T(F): 98.2 (2017 19:15), Max: 98.2 (2017 19:15)  HR: 79 (2017 19:15) (79 - 79)  BP: 133/84 (2017 19:15) (133/84 - 133/84)  RR: 18 (2017 19:15) (18 - 18)    Breasts soft, nontender, nonengorged.  Abdomen: Soft, nontender, nondistended , firm uterine fundus.  Incision clean, dry, intact with staples.  Pelvic: Normal lochia noted  Ext: No DVT tenderness      POD#3 S/P primary  stable. Pt recovering well.  Signs and symptoms normal /  abnormal post op courses reviewed.  OK for D/C home.  F/U in office later this week for staple removal.

## 2018-01-05 ENCOUNTER — RESULT REVIEW (OUTPATIENT)
Age: 33
End: 2018-01-05

## 2018-11-01 PROBLEM — Z00.00 ENCOUNTER FOR PREVENTIVE HEALTH EXAMINATION: Status: ACTIVE | Noted: 2018-11-01

## 2018-11-04 ENCOUNTER — RESULT REVIEW (OUTPATIENT)
Age: 33
End: 2018-11-04

## 2018-11-05 ENCOUNTER — APPOINTMENT (OUTPATIENT)
Dept: DERMATOLOGY | Facility: CLINIC | Age: 33
End: 2018-11-05
Payer: COMMERCIAL

## 2018-11-05 PROCEDURE — 11101 BIOPSY SKIN SUBQ&/MUCOUS MEMBRANE EA ADDL LESN: CPT

## 2018-11-05 PROCEDURE — 11100 BX SKIN SUBCUTANEOUS&/MUCOUS MEMBRANE 1 LESION: CPT

## 2018-11-05 PROCEDURE — 99203 OFFICE O/P NEW LOW 30 MIN: CPT | Mod: 25

## 2019-02-11 ENCOUNTER — RESULT REVIEW (OUTPATIENT)
Age: 34
End: 2019-02-11

## 2019-05-20 ENCOUNTER — TRANSCRIPTION ENCOUNTER (OUTPATIENT)
Age: 34
End: 2019-05-20

## 2019-09-17 ENCOUNTER — OUTPATIENT (OUTPATIENT)
Dept: OUTPATIENT SERVICES | Facility: HOSPITAL | Age: 34
LOS: 1 days | End: 2019-09-17
Payer: COMMERCIAL

## 2019-09-17 VITALS
WEIGHT: 152.12 LBS | HEIGHT: 67 IN | SYSTOLIC BLOOD PRESSURE: 122 MMHG | TEMPERATURE: 98 F | RESPIRATION RATE: 20 BRPM | DIASTOLIC BLOOD PRESSURE: 68 MMHG | HEART RATE: 80 BPM

## 2019-09-17 DIAGNOSIS — Z01.818 ENCOUNTER FOR OTHER PREPROCEDURAL EXAMINATION: ICD-10-CM

## 2019-09-17 LAB
ALBUMIN SERPL ELPH-MCNC: 3.8 G/DL — SIGNIFICANT CHANGE UP (ref 3.3–5.2)
ALP SERPL-CCNC: 127 U/L — HIGH (ref 40–120)
ALT FLD-CCNC: 8 U/L — SIGNIFICANT CHANGE UP
ANION GAP SERPL CALC-SCNC: 12 MMOL/L — SIGNIFICANT CHANGE UP (ref 5–17)
APTT BLD: 26.9 SEC — LOW (ref 27.5–36.3)
AST SERPL-CCNC: 18 U/L — SIGNIFICANT CHANGE UP
BASOPHILS # BLD AUTO: 0.03 K/UL — SIGNIFICANT CHANGE UP (ref 0–0.2)
BASOPHILS NFR BLD AUTO: 0.4 % — SIGNIFICANT CHANGE UP (ref 0–2)
BILIRUB SERPL-MCNC: 0.2 MG/DL — LOW (ref 0.4–2)
BLD GP AB SCN SERPL QL: SIGNIFICANT CHANGE UP
BUN SERPL-MCNC: 9 MG/DL — SIGNIFICANT CHANGE UP (ref 8–20)
CALCIUM SERPL-MCNC: 9.2 MG/DL — SIGNIFICANT CHANGE UP (ref 8.6–10.2)
CHLORIDE SERPL-SCNC: 104 MMOL/L — SIGNIFICANT CHANGE UP (ref 98–107)
CO2 SERPL-SCNC: 21 MMOL/L — LOW (ref 22–29)
CREAT SERPL-MCNC: 0.49 MG/DL — LOW (ref 0.5–1.3)
EOSINOPHIL # BLD AUTO: 0.12 K/UL — SIGNIFICANT CHANGE UP (ref 0–0.5)
EOSINOPHIL NFR BLD AUTO: 1.6 % — SIGNIFICANT CHANGE UP (ref 0–6)
GLUCOSE SERPL-MCNC: 78 MG/DL — SIGNIFICANT CHANGE UP (ref 70–115)
HCT VFR BLD CALC: 34.2 % — LOW (ref 34.5–45)
HGB BLD-MCNC: 11.5 G/DL — SIGNIFICANT CHANGE UP (ref 11.5–15.5)
HIV 1 & 2 AB SERPL IA.RAPID: SIGNIFICANT CHANGE UP
HIV 1+2 AB+HIV1 P24 AG SERPL QL IA: SIGNIFICANT CHANGE UP
IMM GRANULOCYTES NFR BLD AUTO: 0.5 % — SIGNIFICANT CHANGE UP (ref 0–1.5)
INR BLD: 0.89 RATIO — SIGNIFICANT CHANGE UP (ref 0.88–1.16)
LYMPHOCYTES # BLD AUTO: 1.9 K/UL — SIGNIFICANT CHANGE UP (ref 1–3.3)
LYMPHOCYTES # BLD AUTO: 24.5 % — SIGNIFICANT CHANGE UP (ref 13–44)
MCHC RBC-ENTMCNC: 30.1 PG — SIGNIFICANT CHANGE UP (ref 27–34)
MCHC RBC-ENTMCNC: 33.6 GM/DL — SIGNIFICANT CHANGE UP (ref 32–36)
MCV RBC AUTO: 89.5 FL — SIGNIFICANT CHANGE UP (ref 80–100)
MONOCYTES # BLD AUTO: 0.54 K/UL — SIGNIFICANT CHANGE UP (ref 0–0.9)
MONOCYTES NFR BLD AUTO: 7 % — SIGNIFICANT CHANGE UP (ref 2–14)
NEUTROPHILS # BLD AUTO: 5.11 K/UL — SIGNIFICANT CHANGE UP (ref 1.8–7.4)
NEUTROPHILS NFR BLD AUTO: 66 % — SIGNIFICANT CHANGE UP (ref 43–77)
PLATELET # BLD AUTO: 185 K/UL — SIGNIFICANT CHANGE UP (ref 150–400)
POTASSIUM SERPL-MCNC: 3.9 MMOL/L — SIGNIFICANT CHANGE UP (ref 3.5–5.3)
POTASSIUM SERPL-SCNC: 3.9 MMOL/L — SIGNIFICANT CHANGE UP (ref 3.5–5.3)
PROT SERPL-MCNC: 6.8 G/DL — SIGNIFICANT CHANGE UP (ref 6.6–8.7)
PROTHROM AB SERPL-ACNC: 10.2 SEC — SIGNIFICANT CHANGE UP (ref 10–12.9)
RBC # BLD: 3.82 M/UL — SIGNIFICANT CHANGE UP (ref 3.8–5.2)
RBC # FLD: 12.9 % — SIGNIFICANT CHANGE UP (ref 10.3–14.5)
SODIUM SERPL-SCNC: 137 MMOL/L — SIGNIFICANT CHANGE UP (ref 135–145)
WBC # BLD: 7.74 K/UL — SIGNIFICANT CHANGE UP (ref 3.8–10.5)
WBC # FLD AUTO: 7.74 K/UL — SIGNIFICANT CHANGE UP (ref 3.8–10.5)

## 2019-09-17 PROCEDURE — 86901 BLOOD TYPING SEROLOGIC RH(D): CPT

## 2019-09-17 PROCEDURE — 85027 COMPLETE CBC AUTOMATED: CPT

## 2019-09-17 PROCEDURE — 86900 BLOOD TYPING SEROLOGIC ABO: CPT

## 2019-09-17 PROCEDURE — 87389 HIV-1 AG W/HIV-1&-2 AB AG IA: CPT

## 2019-09-17 PROCEDURE — 85730 THROMBOPLASTIN TIME PARTIAL: CPT

## 2019-09-17 PROCEDURE — 80053 COMPREHEN METABOLIC PANEL: CPT

## 2019-09-17 PROCEDURE — 86703 HIV-1/HIV-2 1 RESULT ANTBDY: CPT

## 2019-09-17 PROCEDURE — 36415 COLL VENOUS BLD VENIPUNCTURE: CPT

## 2019-09-17 PROCEDURE — 85610 PROTHROMBIN TIME: CPT

## 2019-09-17 PROCEDURE — 86850 RBC ANTIBODY SCREEN: CPT

## 2019-09-30 ENCOUNTER — TRANSCRIPTION ENCOUNTER (OUTPATIENT)
Age: 34
End: 2019-09-30

## 2019-09-30 RX ORDER — OXYTOCIN 10 UNIT/ML
333.33 VIAL (ML) INJECTION
Qty: 20 | Refills: 0 | Status: DISCONTINUED | OUTPATIENT
Start: 2019-10-01 | End: 2019-10-03

## 2019-09-30 NOTE — OB PROVIDER H&P - ASSESSMENT
33yo  at 39 weeks c/w LMP 19 who presents to L&D for a scheduled rCS    - Admit to L&D  - Admission labs sent  - Pre-operative Ancef  - Proceed with scheduled  delivery 35yo  at 39 weeks c/w LMP 19 who presents to L&D for a scheduled rCS. No acute issues at this time    Plan:  - Reactive tracing  - Admit to L&D  - Admission labs sent  - Pre-operative Ancef  - Proceed with scheduled  delivery

## 2019-09-30 NOTE — OB PROVIDER H&P - NSHPPHYSICALEXAM_GEN_ALL_CORE
Vital Signs Last 24 Hrs  T(C): 36.5 (01 Oct 2019 06:21), Max: 36.5 (01 Oct 2019 06:21)  T(F): 97.7 (01 Oct 2019 06:21), Max: 97.7 (01 Oct 2019 06:21)  HR: 85 (01 Oct 2019 06:28) (85 - 86)  BP: 130/74 (01 Oct 2019 06:28) (130/74 - 140/80)  RR: 20 (01 Oct 2019 06:21) (20 - 20)      General: AOx3, NAD  Abd: Soft, nontender, gravid  SVE: Deferred  BMI (kg/m2): 28.7 (10-01-19 @ 06:21)      FHT: 135 bpm, moderate variability + accels, no decelerations present  [place]: Ctxs every 2-4 minutes.   Sono: Vertex, anterior placenta

## 2019-09-30 NOTE — OB PROVIDER H&P - HISTORY OF PRESENT ILLNESS
Patient is a 33yo  at 39 weeks c/w LMP 19 who presents to L&D for a scheduled rCS   ANDREA: 10/8/19  LMP: 19  Prenatal course complicated by:   1.	Prior CS x 1  OBHx:   G1- pCS at 41wks, 7lbs 13oz, 2017  PMH: DOLLY 2/3 on pap in  s/p laser ablation with return to normal, anemia  PSH: CS x 1 , cervical laser ablation   Meds: PNV, Fe  ALL: Shellfish    GBS: Neg  HIV: NR  RPR: Neg  Rubella: Immune   HepB: NR  ABO: O+ Patient is a 35yo  at 39 weeks c/w LMP 19 who presents to L&D for a scheduled rCS .    No issues at this time. Denies any vaginal bleeding, leakage of fluid or contractions. Reports good fetal movement    ANDREA: 10/8/19  LMP: 19  Prenatal course complicated by:   1.	Prior CS x 1  OBHx:   G1- pCS at 41wks, 7lbs 13oz, 2017 (failed IOL)  PMH: DOLLY 2/3 on pap in  s/p laser ablation with return to normal, anemia  PSH: CS x 1 , cervical laser ablation  (hx of abnormal pap smear   Meds: PNV, Fe  ALL: Shellfish (Clams, experiences abdominal cramping)    GBS: Neg  HIV: NR  RPR: Neg  Rubella: Immune   HepB: NR  ABO: O+ Patient is a 35yo  at 39 weeks c/w LMP 19 who presents to L&D for a scheduled rCS .    Reports intermittent contractions, but otherwise denies any vaginal bleeding, or leakage of fluid. Reports good fetal movement    ADNREA: 10/8/19  LMP: 19  Prenatal course complicated by:   1.	Prior CS x 1  OBHx:   G1- pCS at 41wks, 7lbs 13oz, 2017 (failed IOL)  PMH: DOLLY 2/3 on pap in  s/p laser ablation with return to normal, anemia  PSH: CS x 1 , cervical laser ablation  (hx of abnormal pap smear   Meds: PNV, Fe  ALL: Shellfish (Clams, experiences abdominal cramping)    GBS: Neg  HIV: NR  RPR: Neg  Rubella: Immune   HepB: NR  ABO: O+

## 2019-09-30 NOTE — OB PROVIDER H&P - ATTENDING COMMENTS
As above, pt admitted for scheduled rpt C/S at 39 wks, not in labor.  Pregnancy  uncomplicated.  R/B/A repeat C/S reviewed.

## 2019-10-01 ENCOUNTER — TRANSCRIPTION ENCOUNTER (OUTPATIENT)
Age: 34
End: 2019-10-01

## 2019-10-01 ENCOUNTER — INPATIENT (INPATIENT)
Facility: HOSPITAL | Age: 34
LOS: 1 days | Discharge: HOME | End: 2019-10-03
Payer: COMMERCIAL

## 2019-10-01 VITALS — SYSTOLIC BLOOD PRESSURE: 140 MMHG | HEART RATE: 86 BPM | DIASTOLIC BLOOD PRESSURE: 80 MMHG

## 2019-10-01 DIAGNOSIS — Z3A.39 39 WEEKS GESTATION OF PREGNANCY: ICD-10-CM

## 2019-10-01 DIAGNOSIS — O34.219 MATERNAL CARE FOR UNSPECIFIED TYPE SCAR FROM PREVIOUS CESAREAN DELIVERY: ICD-10-CM

## 2019-10-01 LAB — BLD GP AB SCN SERPL QL: SIGNIFICANT CHANGE UP

## 2019-10-01 PROCEDURE — 59514 CESAREAN DELIVERY ONLY: CPT | Mod: 80

## 2019-10-01 RX ORDER — INFLUENZA VIRUS VACCINE 15; 15; 15; 15 UG/.5ML; UG/.5ML; UG/.5ML; UG/.5ML
0.5 SUSPENSION INTRAMUSCULAR ONCE
Refills: 0 | Status: COMPLETED | OUTPATIENT
Start: 2019-10-01 | End: 2019-10-03

## 2019-10-01 RX ORDER — METOCLOPRAMIDE HCL 10 MG
10 TABLET ORAL ONCE
Refills: 0 | Status: DISCONTINUED | OUTPATIENT
Start: 2019-10-01 | End: 2019-10-01

## 2019-10-01 RX ORDER — KETOROLAC TROMETHAMINE 30 MG/ML
30 SYRINGE (ML) INJECTION EVERY 6 HOURS
Refills: 0 | Status: COMPLETED | OUTPATIENT
Start: 2019-10-01 | End: 2019-10-02

## 2019-10-01 RX ORDER — IBUPROFEN 200 MG
600 TABLET ORAL EVERY 6 HOURS
Refills: 0 | Status: COMPLETED | OUTPATIENT
Start: 2019-10-01 | End: 2020-08-29

## 2019-10-01 RX ORDER — SIMETHICONE 80 MG/1
80 TABLET, CHEWABLE ORAL EVERY 4 HOURS
Refills: 0 | Status: DISCONTINUED | OUTPATIENT
Start: 2019-10-01 | End: 2019-10-03

## 2019-10-01 RX ORDER — OXYCODONE HYDROCHLORIDE 5 MG/1
5 TABLET ORAL
Refills: 0 | Status: DISCONTINUED | OUTPATIENT
Start: 2019-10-01 | End: 2019-10-03

## 2019-10-01 RX ORDER — SODIUM CHLORIDE 9 MG/ML
1000 INJECTION, SOLUTION INTRAVENOUS ONCE
Refills: 0 | Status: COMPLETED | OUTPATIENT
Start: 2019-10-01 | End: 2019-10-01

## 2019-10-01 RX ORDER — DIPHENHYDRAMINE HCL 50 MG
50 CAPSULE ORAL EVERY 4 HOURS
Refills: 0 | Status: DISCONTINUED | OUTPATIENT
Start: 2019-10-01 | End: 2019-10-03

## 2019-10-01 RX ORDER — TETANUS TOXOID, REDUCED DIPHTHERIA TOXOID AND ACELLULAR PERTUSSIS VACCINE, ADSORBED 5; 2.5; 8; 8; 2.5 [IU]/.5ML; [IU]/.5ML; UG/.5ML; UG/.5ML; UG/.5ML
0.5 SUSPENSION INTRAMUSCULAR ONCE
Refills: 0 | Status: COMPLETED | OUTPATIENT
Start: 2019-10-01

## 2019-10-01 RX ORDER — ONDANSETRON 8 MG/1
4 TABLET, FILM COATED ORAL EVERY 6 HOURS
Refills: 0 | Status: DISCONTINUED | OUTPATIENT
Start: 2019-10-01 | End: 2019-10-03

## 2019-10-01 RX ORDER — ACETAMINOPHEN 500 MG
975 TABLET ORAL
Refills: 0 | Status: DISCONTINUED | OUTPATIENT
Start: 2019-10-01 | End: 2019-10-03

## 2019-10-01 RX ORDER — DIPHENHYDRAMINE HCL 50 MG
25 CAPSULE ORAL EVERY 6 HOURS
Refills: 0 | Status: DISCONTINUED | OUTPATIENT
Start: 2019-10-01 | End: 2019-10-03

## 2019-10-01 RX ORDER — NALOXONE HYDROCHLORIDE 4 MG/.1ML
0.1 SPRAY NASAL
Refills: 0 | Status: DISCONTINUED | OUTPATIENT
Start: 2019-10-01 | End: 2019-10-03

## 2019-10-01 RX ORDER — OXYCODONE HYDROCHLORIDE 5 MG/1
10 TABLET ORAL
Refills: 0 | Status: DISCONTINUED | OUTPATIENT
Start: 2019-10-01 | End: 2019-10-03

## 2019-10-01 RX ORDER — LANOLIN
1 OINTMENT (GRAM) TOPICAL EVERY 6 HOURS
Refills: 0 | Status: DISCONTINUED | OUTPATIENT
Start: 2019-10-01 | End: 2019-10-03

## 2019-10-01 RX ORDER — OXYTOCIN 10 UNIT/ML
333.33 VIAL (ML) INJECTION
Qty: 20 | Refills: 0 | Status: DISCONTINUED | OUTPATIENT
Start: 2019-10-01 | End: 2019-10-03

## 2019-10-01 RX ORDER — MAGNESIUM HYDROXIDE 400 MG/1
30 TABLET, CHEWABLE ORAL
Refills: 0 | Status: DISCONTINUED | OUTPATIENT
Start: 2019-10-01 | End: 2019-10-03

## 2019-10-01 RX ORDER — CITRIC ACID/SODIUM CITRATE 300-500 MG
30 SOLUTION, ORAL ORAL ONCE
Refills: 0 | Status: COMPLETED | OUTPATIENT
Start: 2019-10-01 | End: 2019-10-01

## 2019-10-01 RX ORDER — ACETAMINOPHEN 500 MG
1000 TABLET ORAL ONCE
Refills: 0 | Status: COMPLETED | OUTPATIENT
Start: 2019-10-01 | End: 2019-10-01

## 2019-10-01 RX ORDER — OXYCODONE HYDROCHLORIDE 5 MG/1
5 TABLET ORAL ONCE
Refills: 0 | Status: DISCONTINUED | OUTPATIENT
Start: 2019-10-01 | End: 2019-10-03

## 2019-10-01 RX ORDER — SODIUM CHLORIDE 9 MG/ML
1000 INJECTION, SOLUTION INTRAVENOUS
Refills: 0 | Status: DISCONTINUED | OUTPATIENT
Start: 2019-10-01 | End: 2019-10-01

## 2019-10-01 RX ORDER — SODIUM CHLORIDE 9 MG/ML
1000 INJECTION, SOLUTION INTRAVENOUS
Refills: 0 | Status: DISCONTINUED | OUTPATIENT
Start: 2019-10-01 | End: 2019-10-03

## 2019-10-01 RX ORDER — FAMOTIDINE 10 MG/ML
20 INJECTION INTRAVENOUS ONCE
Refills: 0 | Status: COMPLETED | OUTPATIENT
Start: 2019-10-01 | End: 2019-10-01

## 2019-10-01 RX ORDER — DOCUSATE SODIUM 100 MG
100 CAPSULE ORAL
Refills: 0 | Status: DISCONTINUED | OUTPATIENT
Start: 2019-10-01 | End: 2019-10-03

## 2019-10-01 RX ORDER — GLYCERIN ADULT
1 SUPPOSITORY, RECTAL RECTAL AT BEDTIME
Refills: 0 | Status: DISCONTINUED | OUTPATIENT
Start: 2019-10-01 | End: 2019-10-03

## 2019-10-01 RX ORDER — CEFAZOLIN SODIUM 1 G
2000 VIAL (EA) INJECTION ONCE
Refills: 0 | Status: COMPLETED | OUTPATIENT
Start: 2019-10-01 | End: 2019-10-01

## 2019-10-01 RX ORDER — CEFAZOLIN SODIUM 1 G
2000 VIAL (EA) INJECTION EVERY 6 HOURS
Refills: 0 | Status: COMPLETED | OUTPATIENT
Start: 2019-10-01 | End: 2019-10-02

## 2019-10-01 RX ADMIN — Medication 30 MILLILITER(S): at 07:08

## 2019-10-01 RX ADMIN — Medication 1000 MILLIGRAM(S): at 14:00

## 2019-10-01 RX ADMIN — Medication 30 MILLIGRAM(S): at 10:00

## 2019-10-01 RX ADMIN — Medication 30 MILLIGRAM(S): at 23:50

## 2019-10-01 RX ADMIN — SODIUM CHLORIDE 125 MILLILITER(S): 9 INJECTION, SOLUTION INTRAVENOUS at 13:24

## 2019-10-01 RX ADMIN — SODIUM CHLORIDE 2000 MILLILITER(S): 9 INJECTION, SOLUTION INTRAVENOUS at 06:20

## 2019-10-01 RX ADMIN — FAMOTIDINE 20 MILLIGRAM(S): 10 INJECTION INTRAVENOUS at 07:20

## 2019-10-01 RX ADMIN — Medication 975 MILLIGRAM(S): at 21:00

## 2019-10-01 RX ADMIN — Medication 975 MILLIGRAM(S): at 20:25

## 2019-10-01 RX ADMIN — Medication 400 MILLIGRAM(S): at 13:17

## 2019-10-01 RX ADMIN — Medication 100 MILLIGRAM(S): at 19:28

## 2019-10-01 RX ADMIN — SODIUM CHLORIDE 125 MILLILITER(S): 9 INJECTION, SOLUTION INTRAVENOUS at 07:10

## 2019-10-01 RX ADMIN — Medication 100 MILLIGRAM(S): at 07:35

## 2019-10-01 RX ADMIN — Medication 1000 MILLIUNIT(S)/MIN: at 07:55

## 2019-10-01 RX ADMIN — Medication 30 MILLIGRAM(S): at 10:15

## 2019-10-01 NOTE — DISCHARGE NOTE OB - MEDICATION SUMMARY - MEDICATIONS TO TAKE
I will START or STAY ON the medications listed below when I get home from the hospital:    ibuprofen 600 mg oral tablet  -- 1 tab(s) by mouth every 6 hours, As Needed -for severe pain MDD:4 tabs   -- Do not take this drug if you are pregnant.  It is very important that you take or use this exactly as directed.  Do not skip doses or discontinue unless directed by your doctor.  May cause drowsiness or dizziness.  Obtain medical advice before taking any non-prescription drugs as some may affect the action of this medication.  Take with food or milk.    -- Indication: For prn pain

## 2019-10-01 NOTE — OB PROVIDER DELIVERY SUMMARY - NSPROVIDERDELIVERYNOTE_OBGYN_ALL_OB_FT
Pt was taken to the OR for scheduled rpt C/S at 39 wks, not in labor.  C/S performed by Dr ZAYRA Sung, assist Dr VANESSA Tavarez, second assist Dr NIKA Sawyer, PGY-2 under spinal anesthesia.  Delivered live male infant at 7:54am.  Tight nuchal cord x 2 manually reduced.  Amniotic fluid clear.   weight 3170g, 7lb 0oz, APGAR 9/9.  Skin-to-skin intitiated in OR and baby later admitted to [facility].  Uterus, tubes, ovaries WNL.  No intraoperative complications.  Adhesions minimal.  EBL 650cc, UO 50cc, clear.  Pt stable with  to RR.

## 2019-10-01 NOTE — DISCHARGE NOTE OB - CARE PLAN
Principal Discharge DX:	 delivery delivered  Goal:	full recovery  Assessment and plan of treatment:	POD#2 S/P C/S stable - cleared for early D/C home

## 2019-10-01 NOTE — DISCHARGE NOTE OB - CARE PROVIDER_API CALL
Ryanne Sung)  Obstetrics and Gynecology  [address] [place] [address]  Phone: [phone]  Fax: [phone]  Follow Up Time:

## 2019-10-01 NOTE — DISCHARGE NOTE OB - PATIENT PORTAL LINK FT
You can access the FollowMyHealth Patient Portal offered by [facility] by registering at the following website: [email or web address]. By joining [facility]’s FollowMyHealth portal, you will also be able to view your health information using other applications (apps) compatible with our system.

## 2019-10-01 NOTE — OB RN DELIVERY SUMMARY - NS_SEPSISRSKCALC_OBGYN_ALL_OB_FT
EOS calculated successfully. EOS Risk Factor: 0.5/1000 live births ([facility] national incidence); GA=39w;Temp=97.7; ROM=0.033; GBS='Unknown'; Antibiotics='No antibiotics or any antibiotics < 2 hrs prior to birth'

## 2019-10-01 NOTE — DISCHARGE NOTE OB - MEDICATION SUMMARY - MEDICATIONS TO STOP TAKING
I will STOP taking the medications listed below when I get home from the hospital:    Percocet 5/325 oral tablet  -- 1 tab(s) by mouth every 6 hours, As Needed -for severe pain MDD:4 tabs   -- Caution federal law prohibits the transfer of this drug to any person other  than the person for whom it was prescribed.  May cause drowsiness.  Alcohol may intensify this effect.  Use care when operating dangerous machinery.  This prescription cannot be refilled.  This product contains acetaminophen.  Do not use  with any other product containing acetaminophen to prevent possible liver damage.  Using more of this medication than prescribed may cause serious breathing problems.

## 2019-10-01 NOTE — OB NEONATOLOGY/PEDIATRICIAN DELIVERY SUMMARY - NSPEDSNEONOTESA_OBGYN_ALL_OB_FT
Uli requested to attend repeat scheduled  by Dr. Sung. Infant is a 39 week F born to a 33 yo  O++, PNL negative and immune, GBS negative mother. Pregnancy uncomplicated. Maternal history significant for anemia. Family history noncontributory. Social history denies illicit drug use. Infant born vigorous with spontaneous cry. Routine resuscitation. Apgars 9/9. PE unremarkable. BWT Transfer to [facility] for routine care under management of PMD. Uli requested to attend repeat scheduled  by Dr. Sung. Infant is a 39 week M born to a 31 yo  O++, PNL negative and immune, GBS negative mother. Pregnancy uncomplicated. Maternal history significant for anemia. Family history noncontributory. Social history denies illicit drug use. Infant born vigorous with spontaneous cry. Routine resuscitation. Apgars 9/9. PE unremarkable. BWT 3170 g (AGA). Transfer to [facility] for routine care under management of PMD.

## 2019-10-02 LAB
BASOPHILS # BLD AUTO: 0.02 K/UL — SIGNIFICANT CHANGE UP (ref 0–0.2)
BASOPHILS NFR BLD AUTO: 0.2 % — SIGNIFICANT CHANGE UP (ref 0–2)
EOSINOPHIL # BLD AUTO: 0.17 K/UL — SIGNIFICANT CHANGE UP (ref 0–0.5)
EOSINOPHIL NFR BLD AUTO: 1.9 % — SIGNIFICANT CHANGE UP (ref 0–6)
HCT VFR BLD CALC: 28 % — LOW (ref 34.5–45)
HGB BLD-MCNC: 9.2 G/DL — LOW (ref 11.5–15.5)
IMM GRANULOCYTES NFR BLD AUTO: 0.6 % — SIGNIFICANT CHANGE UP (ref 0–1.5)
LYMPHOCYTES # BLD AUTO: 1.33 K/UL — SIGNIFICANT CHANGE UP (ref 1–3.3)
LYMPHOCYTES # BLD AUTO: 15 % — SIGNIFICANT CHANGE UP (ref 13–44)
MCHC RBC-ENTMCNC: 30.1 PG — SIGNIFICANT CHANGE UP (ref 27–34)
MCHC RBC-ENTMCNC: 32.9 GM/DL — SIGNIFICANT CHANGE UP (ref 32–36)
MCV RBC AUTO: 91.5 FL — SIGNIFICANT CHANGE UP (ref 80–100)
MEV IGG SER-ACNC: 151 AU/ML — SIGNIFICANT CHANGE UP
MEV IGG+IGM SER-IMP: POSITIVE — SIGNIFICANT CHANGE UP
MONOCYTES # BLD AUTO: 0.64 K/UL — SIGNIFICANT CHANGE UP (ref 0–0.9)
MONOCYTES NFR BLD AUTO: 7.2 % — SIGNIFICANT CHANGE UP (ref 2–14)
NEUTROPHILS # BLD AUTO: 6.63 K/UL — SIGNIFICANT CHANGE UP (ref 1.8–7.4)
NEUTROPHILS NFR BLD AUTO: 75.1 % — SIGNIFICANT CHANGE UP (ref 43–77)
PLATELET # BLD AUTO: 136 K/UL — LOW (ref 150–400)
RBC # BLD: 3.06 M/UL — LOW (ref 3.8–5.2)
RBC # FLD: 13.2 % — SIGNIFICANT CHANGE UP (ref 10.3–14.5)
T PALLIDUM AB TITR SER: NEGATIVE — SIGNIFICANT CHANGE UP
WBC # BLD: 8.84 K/UL — SIGNIFICANT CHANGE UP (ref 3.8–10.5)
WBC # FLD AUTO: 8.84 K/UL — SIGNIFICANT CHANGE UP (ref 3.8–10.5)

## 2019-10-02 RX ORDER — IBUPROFEN 200 MG
600 TABLET ORAL EVERY 6 HOURS
Refills: 0 | Status: DISCONTINUED | OUTPATIENT
Start: 2019-10-02 | End: 2019-10-03

## 2019-10-02 RX ADMIN — Medication 600 MILLIGRAM(S): at 13:29

## 2019-10-02 RX ADMIN — Medication 975 MILLIGRAM(S): at 03:51

## 2019-10-02 RX ADMIN — Medication 30 MILLIGRAM(S): at 06:00

## 2019-10-02 RX ADMIN — Medication 600 MILLIGRAM(S): at 14:00

## 2019-10-02 RX ADMIN — Medication 975 MILLIGRAM(S): at 21:00

## 2019-10-02 RX ADMIN — Medication 975 MILLIGRAM(S): at 16:35

## 2019-10-02 RX ADMIN — Medication 30 MILLIGRAM(S): at 06:15

## 2019-10-02 RX ADMIN — Medication 975 MILLIGRAM(S): at 10:16

## 2019-10-02 RX ADMIN — Medication 975 MILLIGRAM(S): at 04:30

## 2019-10-02 RX ADMIN — Medication 975 MILLIGRAM(S): at 20:18

## 2019-10-02 RX ADMIN — Medication 30 MILLIGRAM(S): at 00:05

## 2019-10-02 RX ADMIN — Medication 975 MILLIGRAM(S): at 11:00

## 2019-10-02 RX ADMIN — Medication 100 MILLIGRAM(S): at 00:47

## 2019-10-02 NOTE — PROGRESS NOTE ADULT - SUBJECTIVE AND OBJECTIVE BOX
Delivery Progress Note    SUKUMAR Shannon is a 34y year old G* who is post-op day # who delivered a fe_male infant at Gestational Age    by  delivery.     SUBJECTIVE:  No acute events overnight. Pain is well controlled with PRN pain medication. No problems with ambulating, voiding, or PO intake. Has had flatus but no BM. Denies nausea and vomiting. Patient is having appropiate lochia which is decreasing.    She is breastfeeding and the baby is latching on.     Patient desires_denies male infant circumcision at this time.     OBJECTIVE:  Physical exam:  Vital Signs Last 24 Hrs  T(C): 36.9 (02 Oct 2019 04:00), Max: 36.9 (02 Oct 2019 04:00)  T(F): 98.4 (02 Oct 2019 04:00), Max: 98.4 (02 Oct 2019 04:00)  HR: 85 (02 Oct 2019 04:00) (62 - 96)  BP: 119/74 (02 Oct 2019 04:00) (91/72 - 140/80)  BP(mean): --  RR: 18 (02 Oct 2019 04:00) (14 - 20)  SpO2: 98% (02 Oct 2019 04:00) (96% - 100%)  General: alert and oriented x3, no acute distress.  Heart: regular rate and rhythmn, no murmurs, rubs or gallops appreciated.  Lungs: clear to auscultation bilaterally.   breast: soft, no tenderness to palpation.  Abdomen: Soft, appropriately tender to palpitation, firm uterine fundus at umbilicus, bowel sounds present in all four quadrants. Incision appears dry and intact.  Extremities: no tenderness, redness or swelling in lower extremities bilaterally.     Labs:         ASSESMENT  _ No acute events.     PLAN  1. Routine post-op care  2. Continue to encourage ambulation, PO intake and breastfeeding  3. DVT prophylaxis: lovenox/heparin/SCDs  4. Continue pain management PRN.   5. Remove dressing at 24hr post-op  6. Plan to discharge post-op day 3  7. Male infant circumcision prior to discharge  Delivery Progress Note    SUKUMAR Shannon is a 34y year old  who is post-op day 1 who delivered a male infant at 39wks by  delivery.     SUBJECTIVE:  No acute events overnight. Pain is well controlled with PRN pain medication. No problems with ambulating, voiding, or PO intake. Has had flatus but no BM. Denies nausea and vomiting. Patient is having appropiate lochia which is decreasing.    She is breastfeeding and the baby is latching on.       OBJECTIVE:  Physical exam:  Vital Signs Last 24 Hrs  T(C): 36.9 (02 Oct 2019 04:00), Max: 36.9 (02 Oct 2019 04:00)  T(F): 98.4 (02 Oct 2019 04:00), Max: 98.4 (02 Oct 2019 04:00)  HR: 85 (02 Oct 2019 04:00) (62 - 96)  BP: 119/74 (02 Oct 2019 04:00) (91/72 - 140/80)  RR: 18 (02 Oct 2019 04:00) (14 - 20)  SpO2: 98% (02 Oct 2019 04:00) (96% - 100%)  General: alert and oriented x3, no acute distress.  Heart: regular rate and rhythmn, no murmurs, rubs or gallops appreciated.  Lungs: clear to auscultation bilaterally.   breast: soft, no tenderness to palpation.  Abdomen: Soft, appropriately tender to palpitation, firm uterine fundus at umbilicus, bowel sounds present in all four quadrants. Incision appears dry and intact.  Extremities: no tenderness, redness or swelling in lower extremities bilaterally.     Labs:       ASSESMENT  SUKUMAR Shannon is a 34y year old  who is post-op day 1 who delivered a male infant at 39wks by  delivery.  No acute events overnight.    PLAN  1. Routine post-op care  2. Continue to encourage ambulation, PO intake and breastfeeding  3. DVT prophylaxis: lovenox/heparin/SCDs  4. Continue pain management PRN.   5. Remove dressing at 24hr post-op  6. Plan to discharge post-op day 3  7. Male infant circumcision prior to discharge  Delivery Progress Note    SUKUMAR Shannon is a 34y year old  who is post-op day 1 who delivered a male infant at 39wks by  delivery.     SUBJECTIVE:  No acute events overnight. Pain is well controlled with PRN pain medication. No problems with ambulating, voiding, or PO intake. Has had flatus but no BM. Denies nausea and vomiting. Patient is having appropiate lochia which is decreasing.    She is breastfeeding and the baby is latching on.       OBJECTIVE:  Physical exam:  Vital Signs Last 24 Hrs  T(C): 36.9 (02 Oct 2019 04:00), Max: 36.9 (02 Oct 2019 04:00)  T(F): 98.4 (02 Oct 2019 04:00), Max: 98.4 (02 Oct 2019 04:00)  HR: 85 (02 Oct 2019 04:00) (62 - 96)  BP: 119/74 (02 Oct 2019 04:00) (91/72 - 140/80)  RR: 18 (02 Oct 2019 04:00) (14 - 20)  SpO2: 98% (02 Oct 2019 04:00) (96% - 100%)  General: alert and oriented x3, no acute distress.  Heart: regular rate and rhythmn, no murmurs, rubs or gallops appreciated.  Lungs: clear to auscultation bilaterally.   breast: soft, no tenderness to palpation.  Abdomen: Soft, appropriately tender to palpitation, firm uterine fundus at umbilicus, bowel sounds present in all four quadrants. Incision appears dry and intact.  Extremities: no tenderness, redness or swelling in lower extremities bilaterally.     Labs:       ASSESMENT  SUKUMAR Shannon is a 34y year old  who is post-op day 1 who delivered a male infant at 39wks by  delivery.  No acute events overnight.    PLAN  1. Routine post-op care  2. Continue to encourage ambulation, PO intake and breastfeeding  3. DVT prophylaxis: lovenox/heparin/SCDs  4. Continue pain management PRN.   5. Remove dressing at 24hr post-op  6. Plan to discharge post-op day 3  Delivery Progress Note    SUKUMAR Shannon is a 34y year old  who is post-op day 1 who delivered a male infant at 39wks by  delivery.     SUBJECTIVE:  No acute events overnight. Pain is well controlled with PRN pain medication. No problems with ambulating, voiding, or PO intake. Has had flatus but no BM. Denies nausea and vomiting. Patient is having appropiate lochia which is decreasing.      OBJECTIVE:  Physical exam:  Vital Signs Last 24 Hrs  T(C): 36.9 (02 Oct 2019 04:00), Max: 36.9 (02 Oct 2019 04:00)  T(F): 98.4 (02 Oct 2019 04:00), Max: 98.4 (02 Oct 2019 04:00)  HR: 85 (02 Oct 2019 04:00) (62 - 96)  BP: 119/74 (02 Oct 2019 04:00) (91/72 - 140/80)  RR: 18 (02 Oct 2019 04:00) (14 - 20)  SpO2: 98% (02 Oct 2019 04:00) (96% - 100%)  General: alert and oriented x3, no acute distress.  Heart: regular rate and rhythmn, no murmurs, rubs or gallops appreciated.  Lungs: clear to auscultation bilaterally.   breast: soft, no tenderness to palpation.  Abdomen: Soft, appropriately tender to palpitation, firm uterine fundus at umbilicus. Incision appears dry and intact.  Extremities: no tenderness, redness or swelling in lower extremities bilaterally.

## 2019-10-02 NOTE — PROGRESS NOTE ADULT - ASSESSMENT
MARIA ISABEL  SUKUMAR Shannon is a 34y year old  who is post-op day 1 who delivered a male infant at 39wks by  delivery.  No acute events overnight.    PLAN  1. Routine post-op care  2. Continue to encourage ambulation, PO intake and breastfeeding  3. DVT prophylaxis: lovenox/heparin/SCDs  4. Continue pain management PRN.   5. Remove dressing at 24hr post-op  6. Plan to discharge post-op day 3 MARIA ISABEL  SUKUMAR Shannon is a 34y year old  who is post-op day 1 who delivered a male infant at 39wks by  delivery.  No acute events overnight.    PLAN  1. Routine post-op care  2. Continue to encourage ambulation, PO intake and breastfeeding  3. DVT prophylaxis: SCDs   4. Continue pain management PRN.   5. Remove dressing at 24hr post-op  6. Plan to discharge post-op day 3

## 2019-10-03 VITALS
SYSTOLIC BLOOD PRESSURE: 124 MMHG | TEMPERATURE: 99 F | DIASTOLIC BLOOD PRESSURE: 79 MMHG | RESPIRATION RATE: 18 BRPM | HEART RATE: 72 BPM | OXYGEN SATURATION: 98 %

## 2019-10-03 PROCEDURE — 59025 FETAL NON-STRESS TEST: CPT

## 2019-10-03 PROCEDURE — 85027 COMPLETE CBC AUTOMATED: CPT

## 2019-10-03 PROCEDURE — 86900 BLOOD TYPING SEROLOGIC ABO: CPT

## 2019-10-03 PROCEDURE — 86850 RBC ANTIBODY SCREEN: CPT

## 2019-10-03 PROCEDURE — 86901 BLOOD TYPING SEROLOGIC RH(D): CPT

## 2019-10-03 PROCEDURE — 59050 FETAL MONITOR W/REPORT: CPT

## 2019-10-03 PROCEDURE — 86765 RUBEOLA ANTIBODY: CPT

## 2019-10-03 PROCEDURE — 86780 TREPONEMA PALLIDUM: CPT

## 2019-10-03 PROCEDURE — 90686 IIV4 VACC NO PRSV 0.5 ML IM: CPT

## 2019-10-03 PROCEDURE — 36415 COLL VENOUS BLD VENIPUNCTURE: CPT

## 2019-10-03 PROCEDURE — 90715 TDAP VACCINE 7 YRS/> IM: CPT

## 2019-10-03 RX ORDER — IBUPROFEN 200 MG
1 TABLET ORAL
Qty: 24 | Refills: 0
Start: 2019-10-03

## 2019-10-03 RX ORDER — TETANUS TOXOID, REDUCED DIPHTHERIA TOXOID AND ACELLULAR PERTUSSIS VACCINE, ADSORBED 5; 2.5; 8; 8; 2.5 [IU]/.5ML; [IU]/.5ML; UG/.5ML; UG/.5ML; UG/.5ML
0.5 SUSPENSION INTRAMUSCULAR ONCE
Refills: 0 | Status: COMPLETED | OUTPATIENT
Start: 2019-10-03 | End: 2019-10-03

## 2019-10-03 RX ADMIN — INFLUENZA VIRUS VACCINE 0.5 MILLILITER(S): 15; 15; 15; 15 SUSPENSION INTRAMUSCULAR at 06:06

## 2019-10-03 RX ADMIN — Medication 600 MILLIGRAM(S): at 06:40

## 2019-10-03 RX ADMIN — Medication 975 MILLIGRAM(S): at 09:42

## 2019-10-03 RX ADMIN — TETANUS TOXOID, REDUCED DIPHTHERIA TOXOID AND ACELLULAR PERTUSSIS VACCINE, ADSORBED 0.5 MILLILITER(S): 5; 2.5; 8; 8; 2.5 SUSPENSION INTRAMUSCULAR at 06:05

## 2019-10-03 RX ADMIN — Medication 600 MILLIGRAM(S): at 06:05

## 2019-10-03 RX ADMIN — Medication 975 MILLIGRAM(S): at 10:40

## 2019-10-03 RX ADMIN — Medication 600 MILLIGRAM(S): at 01:15

## 2019-10-03 RX ADMIN — Medication 600 MILLIGRAM(S): at 00:33

## 2019-10-03 NOTE — PROGRESS NOTE ADULT - ATTENDING COMMENTS
As above, pt doing well.  OK for early D/C home today if  is discharge.  F/U in office next week for staple removal.

## 2019-10-03 NOTE — PROGRESS NOTE ADULT - ASSESSMENT
A/P: Patient is a 34y  s/p rCS now POD2 -- doing well postpartum  - Stable  - Hgb 11.5 -> 9.2  - Pain: well controlled on OPM  - GI: regular diet  - : voiding  - DVT ppx: ambulate  - Continue routine postop care  - Dispo: Pending attending approval

## 2019-10-03 NOTE — PROGRESS NOTE ADULT - SUBJECTIVE AND OBJECTIVE BOX
SUKUMAR SANTOS is a 34y  s/p rCS now POD2 of a viable male infant.   Patient interested in going home today.    SUBJECTIVE:  No acute events overnight, patient has no complaints.  Pain is well controlled with PRN pain medication.  She is ambulating, voiding, tolerating PO. Denies N/V  +flatus, -BM.  minimal lochia.    OBJECTIVE:  Physical exam:  General: AOx3, NAD.  Abdomen: +BS, Soft, appropriately tender to palpitation, firm uterine fundus at umbilicus. Incision is closed with staples and is clean dry and intact.  Ext: No DVT signs, warm extremities.    Vital Signs Last 24 Hrs  T(C): 37.2 (03 Oct 2019 00:00), Max: 37.2 (03 Oct 2019 00:00)  T(F): 98.9 (03 Oct 2019 00:00), Max: 98.9 (03 Oct 2019 00:00)  HR: 72 (03 Oct 2019 00:00) (72 - 93)  BP: 124/79 (03 Oct 2019 00:00) (110/72 - 124/79)  RR: 18 (03 Oct 2019 00:00) (18 - 18)  SpO2: 98% (03 Oct 2019 00:00) (98% - 98%)    LABS:                        9.2    8.84  )-----------( 136      ( 02 Oct 2019 05:54 )             28.0

## 2019-11-21 ENCOUNTER — RESULT REVIEW (OUTPATIENT)
Age: 34
End: 2019-11-21

## 2020-12-21 ENCOUNTER — RESULT REVIEW (OUTPATIENT)
Age: 35
End: 2020-12-21

## 2021-05-10 NOTE — OB PROVIDER DELIVERY SUMMARY - NSPRESENTATIONA_OBGYN_ALL_OB
Patient requesting his daily medication including BP medication  No BP medication listed on med rec  Contacted PCP office   Karli to fax medication list       Leslee Rosales RN  05/10/21 [phone] [place]

## 2021-05-28 PROBLEM — D64.9 ANEMIA, UNSPECIFIED: Chronic | Status: ACTIVE | Noted: 2019-09-17

## 2021-06-14 ENCOUNTER — APPOINTMENT (OUTPATIENT)
Dept: DERMATOLOGY | Facility: CLINIC | Age: 36
End: 2021-06-14

## 2021-08-02 ENCOUNTER — APPOINTMENT (OUTPATIENT)
Dept: DERMATOLOGY | Facility: CLINIC | Age: 36
End: 2021-08-02
Payer: COMMERCIAL

## 2021-08-02 PROCEDURE — 99214 OFFICE O/P EST MOD 30 MIN: CPT

## 2022-01-21 ENCOUNTER — RESULT REVIEW (OUTPATIENT)
Age: 37
End: 2022-01-21

## 2022-02-04 NOTE — OB PST NOTE - TEACHING/LEARNING FACTORS IMPACT ABILITY TO LEARN
none Double O-Z Plasty Text: The defect edges were debeveled with a #15 scalpel blade.  Given the location of the defect, shape of the defect and the proximity to free margins a Double O-Z plasty (double transposition flap) was deemed most appropriate.  Using a sterile surgical marker, the appropriate transposition flaps were drawn incorporating the defect and placing the expected incisions within the relaxed skin tension lines where possible. The area thus outlined was incised deep to adipose tissue with a #15 scalpel blade.  The skin margins were undermined to an appropriate distance in all directions utilizing iris scissors.  Hemostasis was achieved with electrocautery.  The flaps were then transposed into place, one clockwise and the other counterclockwise, and anchored with interrupted buried subcutaneous sutures.

## 2022-06-17 ENCOUNTER — OUTPATIENT (OUTPATIENT)
Dept: OUTPATIENT SERVICES | Facility: HOSPITAL | Age: 37
LOS: 1 days | End: 2022-06-17

## 2022-06-17 VITALS
RESPIRATION RATE: 18 BRPM | SYSTOLIC BLOOD PRESSURE: 138 MMHG | WEIGHT: 169.76 LBS | HEART RATE: 118 BPM | DIASTOLIC BLOOD PRESSURE: 83 MMHG | HEIGHT: 66 IN

## 2022-06-17 DIAGNOSIS — Z01.818 ENCOUNTER FOR OTHER PREPROCEDURAL EXAMINATION: ICD-10-CM

## 2022-06-17 LAB
ALBUMIN SERPL ELPH-MCNC: 3.6 G/DL — SIGNIFICANT CHANGE UP (ref 3.3–5.2)
ALP SERPL-CCNC: 112 U/L — SIGNIFICANT CHANGE UP (ref 40–120)
ALT FLD-CCNC: 8 U/L — SIGNIFICANT CHANGE UP
ANION GAP SERPL CALC-SCNC: 14 MMOL/L — SIGNIFICANT CHANGE UP (ref 5–17)
APTT BLD: 27.8 SEC — SIGNIFICANT CHANGE UP (ref 27.5–35.5)
AST SERPL-CCNC: 17 U/L — SIGNIFICANT CHANGE UP
BILIRUB SERPL-MCNC: <0.2 MG/DL — LOW (ref 0.4–2)
BLD GP AB SCN SERPL QL: SIGNIFICANT CHANGE UP
BUN SERPL-MCNC: 5.8 MG/DL — LOW (ref 8–20)
CALCIUM SERPL-MCNC: 8.9 MG/DL — SIGNIFICANT CHANGE UP (ref 8.6–10.2)
CHLORIDE SERPL-SCNC: 102 MMOL/L — SIGNIFICANT CHANGE UP (ref 98–107)
CO2 SERPL-SCNC: 19 MMOL/L — LOW (ref 22–29)
CREAT SERPL-MCNC: 0.61 MG/DL — SIGNIFICANT CHANGE UP (ref 0.5–1.3)
EGFR: 120 ML/MIN/1.73M2 — SIGNIFICANT CHANGE UP
GLUCOSE SERPL-MCNC: 86 MG/DL — SIGNIFICANT CHANGE UP (ref 70–99)
HCT VFR BLD CALC: 34.1 % — LOW (ref 34.5–45)
HGB BLD-MCNC: 11.3 G/DL — LOW (ref 11.5–15.5)
MCHC RBC-ENTMCNC: 30.5 PG — SIGNIFICANT CHANGE UP (ref 27–34)
MCHC RBC-ENTMCNC: 33.1 GM/DL — SIGNIFICANT CHANGE UP (ref 32–36)
MCV RBC AUTO: 91.9 FL — SIGNIFICANT CHANGE UP (ref 80–100)
PLATELET # BLD AUTO: 168 K/UL — SIGNIFICANT CHANGE UP (ref 150–400)
POTASSIUM SERPL-MCNC: 3.8 MMOL/L — SIGNIFICANT CHANGE UP (ref 3.5–5.3)
POTASSIUM SERPL-SCNC: 3.8 MMOL/L — SIGNIFICANT CHANGE UP (ref 3.5–5.3)
PROT SERPL-MCNC: 6.8 G/DL — SIGNIFICANT CHANGE UP (ref 6.6–8.7)
RBC # BLD: 3.71 M/UL — LOW (ref 3.8–5.2)
RBC # FLD: 13.5 % — SIGNIFICANT CHANGE UP (ref 10.3–14.5)
SODIUM SERPL-SCNC: 134 MMOL/L — LOW (ref 135–145)
WBC # BLD: 7.27 K/UL — SIGNIFICANT CHANGE UP (ref 3.8–10.5)
WBC # FLD AUTO: 7.27 K/UL — SIGNIFICANT CHANGE UP (ref 3.8–10.5)

## 2022-06-29 RX ORDER — OXYTOCIN 10 UNIT/ML
333.33 VIAL (ML) INJECTION
Qty: 20 | Refills: 0 | Status: DISCONTINUED | OUTPATIENT
Start: 2022-07-01 | End: 2022-07-03

## 2022-06-30 ENCOUNTER — TRANSCRIPTION ENCOUNTER (OUTPATIENT)
Age: 37
End: 2022-06-30

## 2022-07-01 ENCOUNTER — TRANSCRIPTION ENCOUNTER (OUTPATIENT)
Age: 37
End: 2022-07-01

## 2022-07-01 ENCOUNTER — RESULT REVIEW (OUTPATIENT)
Age: 37
End: 2022-07-01

## 2022-07-01 ENCOUNTER — INPATIENT (INPATIENT)
Facility: HOSPITAL | Age: 37
LOS: 1 days | Discharge: HOME | End: 2022-07-03
Payer: COMMERCIAL

## 2022-07-01 VITALS
RESPIRATION RATE: 16 BRPM | WEIGHT: 199.96 LBS | HEIGHT: 66 IN | SYSTOLIC BLOOD PRESSURE: 142 MMHG | DIASTOLIC BLOOD PRESSURE: 84 MMHG | TEMPERATURE: 98 F | HEART RATE: 100 BPM

## 2022-07-01 DIAGNOSIS — O34.219 MATERNAL CARE FOR UNSPECIFIED TYPE SCAR FROM PREVIOUS CESAREAN DELIVERY: ICD-10-CM

## 2022-07-01 LAB
BASOPHILS # BLD AUTO: 0.03 K/UL — SIGNIFICANT CHANGE UP (ref 0–0.2)
BASOPHILS NFR BLD AUTO: 0.4 % — SIGNIFICANT CHANGE UP (ref 0–2)
BLD GP AB SCN SERPL QL: SIGNIFICANT CHANGE UP
COVID-19 SPIKE DOMAIN AB INTERP: POSITIVE
COVID-19 SPIKE DOMAIN ANTIBODY RESULT: >250 U/ML — HIGH
EOSINOPHIL # BLD AUTO: 0.08 K/UL — SIGNIFICANT CHANGE UP (ref 0–0.5)
EOSINOPHIL NFR BLD AUTO: 1 % — SIGNIFICANT CHANGE UP (ref 0–6)
HCT VFR BLD CALC: 31.2 % — LOW (ref 34.5–45)
HGB BLD-MCNC: 10.6 G/DL — LOW (ref 11.5–15.5)
HIV 1 & 2 AB SERPL IA.RAPID: SIGNIFICANT CHANGE UP
HIV 1+2 AB+HIV1 P24 AG SERPL QL IA: SIGNIFICANT CHANGE UP
IMM GRANULOCYTES NFR BLD AUTO: 1.2 % — SIGNIFICANT CHANGE UP (ref 0–1.5)
LYMPHOCYTES # BLD AUTO: 1.86 K/UL — SIGNIFICANT CHANGE UP (ref 1–3.3)
LYMPHOCYTES # BLD AUTO: 22.1 % — SIGNIFICANT CHANGE UP (ref 13–44)
MCHC RBC-ENTMCNC: 30.4 PG — SIGNIFICANT CHANGE UP (ref 27–34)
MCHC RBC-ENTMCNC: 34 GM/DL — SIGNIFICANT CHANGE UP (ref 32–36)
MCV RBC AUTO: 89.4 FL — SIGNIFICANT CHANGE UP (ref 80–100)
MONOCYTES # BLD AUTO: 0.64 K/UL — SIGNIFICANT CHANGE UP (ref 0–0.9)
MONOCYTES NFR BLD AUTO: 7.6 % — SIGNIFICANT CHANGE UP (ref 2–14)
NEUTROPHILS # BLD AUTO: 5.69 K/UL — SIGNIFICANT CHANGE UP (ref 1.8–7.4)
NEUTROPHILS NFR BLD AUTO: 67.7 % — SIGNIFICANT CHANGE UP (ref 43–77)
PLATELET # BLD AUTO: 181 K/UL — SIGNIFICANT CHANGE UP (ref 150–400)
RBC # BLD: 3.49 M/UL — LOW (ref 3.8–5.2)
RBC # FLD: 13.2 % — SIGNIFICANT CHANGE UP (ref 10.3–14.5)
SARS-COV-2 IGG+IGM SERPL QL IA: >250 U/ML — HIGH
SARS-COV-2 IGG+IGM SERPL QL IA: POSITIVE
SARS-COV-2 RNA SPEC QL NAA+PROBE: SIGNIFICANT CHANGE UP
T PALLIDUM AB TITR SER: NEGATIVE — SIGNIFICANT CHANGE UP
WBC # BLD: 8.4 K/UL — SIGNIFICANT CHANGE UP (ref 3.8–10.5)
WBC # FLD AUTO: 8.4 K/UL — SIGNIFICANT CHANGE UP (ref 3.8–10.5)

## 2022-07-01 PROCEDURE — 88302 TISSUE EXAM BY PATHOLOGIST: CPT | Mod: 26

## 2022-07-01 RX ORDER — CITRIC ACID/SODIUM CITRATE 300-500 MG
30 SOLUTION, ORAL ORAL ONCE
Refills: 0 | Status: COMPLETED | OUTPATIENT
Start: 2022-07-01 | End: 2022-07-01

## 2022-07-01 RX ORDER — KETOROLAC TROMETHAMINE 30 MG/ML
30 SYRINGE (ML) INJECTION EVERY 6 HOURS
Refills: 0 | Status: DISCONTINUED | OUTPATIENT
Start: 2022-07-01 | End: 2022-07-02

## 2022-07-01 RX ORDER — TETANUS TOXOID, REDUCED DIPHTHERIA TOXOID AND ACELLULAR PERTUSSIS VACCINE, ADSORBED 5; 2.5; 8; 8; 2.5 [IU]/.5ML; [IU]/.5ML; UG/.5ML; UG/.5ML; UG/.5ML
0.5 SUSPENSION INTRAMUSCULAR ONCE
Refills: 0 | Status: DISCONTINUED | OUTPATIENT
Start: 2022-07-01 | End: 2022-07-03

## 2022-07-01 RX ORDER — SIMETHICONE 80 MG/1
80 TABLET, CHEWABLE ORAL EVERY 4 HOURS
Refills: 0 | Status: DISCONTINUED | OUTPATIENT
Start: 2022-07-01 | End: 2022-07-03

## 2022-07-01 RX ORDER — MAGNESIUM HYDROXIDE 400 MG/1
30 TABLET, CHEWABLE ORAL
Refills: 0 | Status: DISCONTINUED | OUTPATIENT
Start: 2022-07-01 | End: 2022-07-03

## 2022-07-01 RX ORDER — CEFAZOLIN SODIUM 1 G
2000 VIAL (EA) INJECTION EVERY 8 HOURS
Refills: 0 | Status: COMPLETED | OUTPATIENT
Start: 2022-07-01 | End: 2022-07-02

## 2022-07-01 RX ORDER — SODIUM CHLORIDE 9 MG/ML
1000 INJECTION, SOLUTION INTRAVENOUS
Refills: 0 | Status: DISCONTINUED | OUTPATIENT
Start: 2022-07-01 | End: 2022-07-03

## 2022-07-01 RX ORDER — IBUPROFEN 200 MG
600 TABLET ORAL EVERY 6 HOURS
Refills: 0 | Status: COMPLETED | OUTPATIENT
Start: 2022-07-01 | End: 2023-05-30

## 2022-07-01 RX ORDER — OXYTOCIN 10 UNIT/ML
333.33 VIAL (ML) INJECTION
Qty: 20 | Refills: 0 | Status: DISCONTINUED | OUTPATIENT
Start: 2022-07-01 | End: 2022-07-03

## 2022-07-01 RX ORDER — ACETAMINOPHEN 500 MG
975 TABLET ORAL
Refills: 0 | Status: DISCONTINUED | OUTPATIENT
Start: 2022-07-01 | End: 2022-07-03

## 2022-07-01 RX ORDER — CEFAZOLIN SODIUM 1 G
2000 VIAL (EA) INJECTION ONCE
Refills: 0 | Status: COMPLETED | OUTPATIENT
Start: 2022-07-01 | End: 2022-07-01

## 2022-07-01 RX ORDER — OXYCODONE HYDROCHLORIDE 5 MG/1
5 TABLET ORAL
Refills: 0 | Status: DISCONTINUED | OUTPATIENT
Start: 2022-07-01 | End: 2022-07-03

## 2022-07-01 RX ORDER — TRANEXAMIC ACID 100 MG/ML
1000 INJECTION, SOLUTION INTRAVENOUS ONCE
Refills: 0 | Status: COMPLETED | OUTPATIENT
Start: 2022-07-01 | End: 2022-07-01

## 2022-07-01 RX ORDER — SODIUM CHLORIDE 9 MG/ML
1000 INJECTION, SOLUTION INTRAVENOUS ONCE
Refills: 0 | Status: COMPLETED | OUTPATIENT
Start: 2022-07-01 | End: 2022-07-01

## 2022-07-01 RX ORDER — FAMOTIDINE 10 MG/ML
20 INJECTION INTRAVENOUS ONCE
Refills: 0 | Status: COMPLETED | OUTPATIENT
Start: 2022-07-01 | End: 2022-07-01

## 2022-07-01 RX ORDER — OXYCODONE HYDROCHLORIDE 5 MG/1
5 TABLET ORAL ONCE
Refills: 0 | Status: DISCONTINUED | OUTPATIENT
Start: 2022-07-01 | End: 2022-07-03

## 2022-07-01 RX ORDER — SODIUM CHLORIDE 9 MG/ML
1000 INJECTION, SOLUTION INTRAVENOUS
Refills: 0 | Status: DISCONTINUED | OUTPATIENT
Start: 2022-07-01 | End: 2022-07-01

## 2022-07-01 RX ORDER — DIPHENHYDRAMINE HCL 50 MG
25 CAPSULE ORAL EVERY 6 HOURS
Refills: 0 | Status: DISCONTINUED | OUTPATIENT
Start: 2022-07-01 | End: 2022-07-03

## 2022-07-01 RX ORDER — LANOLIN
1 OINTMENT (GRAM) TOPICAL EVERY 6 HOURS
Refills: 0 | Status: DISCONTINUED | OUTPATIENT
Start: 2022-07-01 | End: 2022-07-03

## 2022-07-01 RX ADMIN — Medication 975 MILLIGRAM(S): at 21:41

## 2022-07-01 RX ADMIN — Medication 100 MILLIGRAM(S): at 08:10

## 2022-07-01 RX ADMIN — SODIUM CHLORIDE 2000 MILLILITER(S): 9 INJECTION, SOLUTION INTRAVENOUS at 06:22

## 2022-07-01 RX ADMIN — TRANEXAMIC ACID 220 MILLIGRAM(S): 100 INJECTION, SOLUTION INTRAVENOUS at 07:40

## 2022-07-01 RX ADMIN — Medication 1000 MILLIUNIT(S)/MIN: at 08:36

## 2022-07-01 RX ADMIN — Medication 100 MILLIGRAM(S): at 15:06

## 2022-07-01 RX ADMIN — Medication 30 MILLILITER(S): at 07:16

## 2022-07-01 RX ADMIN — Medication 30 MILLIGRAM(S): at 13:14

## 2022-07-01 RX ADMIN — Medication 975 MILLIGRAM(S): at 15:07

## 2022-07-01 RX ADMIN — FAMOTIDINE 20 MILLIGRAM(S): 10 INJECTION INTRAVENOUS at 07:19

## 2022-07-01 RX ADMIN — Medication 30 MILLIGRAM(S): at 18:12

## 2022-07-01 NOTE — DISCHARGE NOTE OB - MEDICATION SUMMARY - MEDICATIONS TO TAKE
I will START or STAY ON the medications listed below when I get home from the hospital:    acetaminophen 325 mg oral capsule  -- 3 cap(s) by mouth every 6 hours   -- Indication: For pain    ibuprofen 600 mg oral tablet  -- 1 tab(s) by mouth every 6 hours   -- Do not take this drug if you are pregnant.  It is very important that you take or use this exactly as directed.  Do not skip doses or discontinue unless directed by your doctor.  May cause drowsiness or dizziness.  Obtain medical advice before taking any non-prescription drugs as some may affect the action of this medication.  Take with food or milk.    -- Indication: For pain    ferrous sulfate 325 mg (65 mg elemental iron) oral tablet  -- 1 tab(s) by mouth once a day   -- Check with your doctor before becoming pregnant.  Do not chew, break, or crush.  May discolor urine or feces.    -- Indication: For iron   I will START or STAY ON the medications listed below when I get home from the hospital:    acetaminophen 325 mg oral capsule  -- 3 cap(s) by mouth every 6 hours   -- Indication: For Maternal care for scar from previous  delivery    ibuprofen 600 mg oral tablet  -- 1 tab(s) by mouth every 6 hours   -- Do not take this drug if you are pregnant.  It is very important that you take or use this exactly as directed.  Do not skip doses or discontinue unless directed by your doctor.  May cause drowsiness or dizziness.  Obtain medical advice before taking any non-prescription drugs as some may affect the action of this medication.  Take with food or milk.    -- Indication: For Maternal care for scar from previous  delivery    oxyCODONE 5 mg oral capsule  -- 1 cap(s) by mouth every 8 hours MDD:3  -- Caution federal law prohibits the transfer of this drug to any person other  than the person for whom it was prescribed.  It is very important that you take or use this exactly as directed.  Do not skip doses or discontinue unless directed by your doctor.  May cause drowsiness or dizziness.  This prescription cannot be refilled.  Using more of this medication than prescribed may cause serious breathing problems.    -- Indication: For Maternal care for scar from previous  delivery    ferrous sulfate 325 mg (65 mg elemental iron) oral tablet  -- 1 tab(s) by mouth once a day   -- Check with your doctor before becoming pregnant.  Do not chew, break, or crush.  May discolor urine or feces.    -- Indication: For Maternal care for scar from previous  delivery

## 2022-07-01 NOTE — OB RN DELIVERY SUMMARY - NSSELHIDDEN_OBGYN_ALL_OB_FT
[NS_DeliveryAttending1_OBGYN_ALL_OB_FT:[id number]==],[NS_DeliveryAttending2_OBGYN_ALL_OB_FT:[id number]]

## 2022-07-01 NOTE — OB PROVIDER DELIVERY SUMMARY - NSSELHIDDEN_OBGYN_ALL_OB_FT
[NS_DeliveryAttending1_OBGYN_ALL_OB_FT:[id number]==],[NS_DeliveryAttending2_OBGYN_ALL_OB_FT:[id number]==]

## 2022-07-01 NOTE — DISCHARGE NOTE OB - NS MD DC FALL RISK RISK
For information on Fall & Injury Prevention, visit: [email or web address] OR  [email or web address] OR  https://www.cdc.gov/steadi/patient.html

## 2022-07-01 NOTE — OB RN PATIENT PROFILE - FALL HARM RISK - UNIVERSAL INTERVENTIONS
Bed in lowest position, wheels locked, appropriate side rails in place/Call bell, personal items and telephone in reach/Instruct patient to call for assistance before getting out of bed or chair/Non-slip footwear when patient is out of bed/[place] to call system/Physically safe environment - no spills, clutter or unnecessary equipment/Purposeful Proactive Rounding/Room/bathroom lighting operational, light cord in reach

## 2022-07-01 NOTE — DISCHARGE NOTE OB - HOSPITAL COURSE
-counseled on lifestyle intervention with diet, exercise and weight loss Patient underwent a  delivery. Post-op course was complicated by acute blood loss, on PO iron. Pain is well controlled with PRN medication. She has no difficulty with ambulation, voiding, or PO intake. Lab values and vital signs are within normal limits prior to discharge.

## 2022-07-01 NOTE — DISCHARGE NOTE OB - PLAN OF CARE
1) Please take ibuprofen and/or Tylenol as needed for pain as prescribed.  2) Nothing in the vagina for 6 weeks (including no sex, no tampons, and no douching).  3) Please call your doctor for a follow up your postpartum appointment in 1 week.  4) Please continue taking vitamins postpartum. Continue PO iron for anemia due to acute blood loss.  5) Please call the office sooner if you have heavy vaginal bleeding, severe abdominal pain, or fever > 100.4F.  6) You may resume regular daily activity as tolerated Will treat with PO iron supplementation

## 2022-07-01 NOTE — OB PROVIDER H&P - ASSESSMENT
Patient is a 36 year old  at 39wks who presents to L&D for rCS and BS  Patient is a 36 year old  at 39wks who presents to L&D for repeat C/S with B/L salpingectomy, not in labor.

## 2022-07-01 NOTE — OB RN DELIVERY SUMMARY - NS_SEPSISRSKCALC_OBGYN_ALL_OB_FT
EOS calculated successfully. EOS Risk Factor: 0.5/1000 live births ([facility] national incidence); GA=39w;Temp=97.8; ROM=0.017; GBS='Negative'; Antibiotics='No antibiotics or any antibiotics < 2 hrs prior to birth'

## 2022-07-01 NOTE — DISCHARGE NOTE OB - CARE PROVIDER_API CALL
Ryanne Sung)  Obstetrics and Gynecology  [address] [place] [address]  Phone: [phone]  Fax: [phone]  Follow Up Time:

## 2022-07-01 NOTE — OB RN INTRAOPERATIVE NOTE - NSSELHIDDEN_OBGYN_ALL_OB_FT
[NS_DeliveryAttending1_OBGYN_ALL_OB_FT:[id number]==],[NS_DeliveryAttending2_OBGYN_ALL_OB_FT:[id number]==],[NS_DeliveryRN_OBGYN_ALL_OB_FT:[id number]]

## 2022-07-01 NOTE — OB PROVIDER H&P - HISTORY OF PRESENT ILLNESS
Patient is a 36 year old  at 39wks who presents to L&D for rCS and BS          ANDREA: 24     LMP:           Pregnancy course uncomplicated          Obhx: pCS , rCS 2019     Gynhx: LEEP ,      Pmhx: denies     Pshx: denies     Meds: PNV     Allergies: NKDA, clams (vomiting), covid vaccine (hives)     SocialHx: denies x 3          BMI:      Ultrasound:      EFW:  Patient is a 36 year old  at 39wks who presents to L&D for rCS with bilateral salpingectomy.  No CTXs, no LOF, no VB, +FM.  Reviewed R/B/A C/S with salpingectomy.         ANDREA: 22      Pregnancy course uncomplicated      Obhx: pCS 2017, rCS      Gynhx: LEEP ,      Pmhx: denies     Meds: PNV     Allergies: NKDA, clams (vomiting), covid vaccine (hives)     SocialHx: denies x 3

## 2022-07-01 NOTE — DISCHARGE NOTE OB - PATIENT PORTAL LINK FT
You can access the FollowMyHealth Patient Portal offered by [facility] by registering at the following website: [email or web address]. By joining [facility]’s FollowMyHealth portal, you will also be able to view your health information using other applications (apps) compatible with our system.

## 2022-07-01 NOTE — OB PROVIDER H&P - ATTENDING COMMENTS
As above, 36 yr old  at 39wks gestation admitted for rpt C/S, not in labor.  H/O C/S x 2.  Plan for B/L salpingectomy at time of section, R/B/A understood.

## 2022-07-01 NOTE — DISCHARGE NOTE OB - CARE PLAN
Principal Discharge DX:	Delivery normal  Assessment and plan of treatment:	1) Please take ibuprofen and/or Tylenol as needed for pain as prescribed.  2) Nothing in the vagina for 6 weeks (including no sex, no tampons, and no douching).  3) Please call your doctor for a follow up your postpartum appointment in 1 week.  4) Please continue taking vitamins postpartum. Continue PO iron for anemia due to acute blood loss.  5) Please call the office sooner if you have heavy vaginal bleeding, severe abdominal pain, or fever > 100.4F.  6) You may resume regular daily activity as tolerated   1 Principal Discharge DX:	Delivery normal  Assessment and plan of treatment:	1) Please take ibuprofen and/or Tylenol as needed for pain as prescribed.  2) Nothing in the vagina for 6 weeks (including no sex, no tampons, and no douching).  3) Please call your doctor for a follow up your postpartum appointment in 1 week.  4) Please continue taking vitamins postpartum. Continue PO iron for anemia due to acute blood loss.  5) Please call the office sooner if you have heavy vaginal bleeding, severe abdominal pain, or fever > 100.4F.  6) You may resume regular daily activity as tolerated  Secondary Diagnosis:	Anemia due to acute blood loss  Assessment and plan of treatment:	Will treat with PO iron supplementation

## 2022-07-02 LAB
BASOPHILS # BLD AUTO: 0.03 K/UL — SIGNIFICANT CHANGE UP (ref 0–0.2)
BASOPHILS NFR BLD AUTO: 0.3 % — SIGNIFICANT CHANGE UP (ref 0–2)
EOSINOPHIL # BLD AUTO: 0.07 K/UL — SIGNIFICANT CHANGE UP (ref 0–0.5)
EOSINOPHIL NFR BLD AUTO: 0.7 % — SIGNIFICANT CHANGE UP (ref 0–6)
HCT VFR BLD CALC: 26.1 % — LOW (ref 34.5–45)
HGB BLD-MCNC: 8.7 G/DL — LOW (ref 11.5–15.5)
IMM GRANULOCYTES NFR BLD AUTO: 0.6 % — SIGNIFICANT CHANGE UP (ref 0–1.5)
LYMPHOCYTES # BLD AUTO: 1.91 K/UL — SIGNIFICANT CHANGE UP (ref 1–3.3)
LYMPHOCYTES # BLD AUTO: 20.3 % — SIGNIFICANT CHANGE UP (ref 13–44)
MCHC RBC-ENTMCNC: 30.4 PG — SIGNIFICANT CHANGE UP (ref 27–34)
MCHC RBC-ENTMCNC: 33.3 GM/DL — SIGNIFICANT CHANGE UP (ref 32–36)
MCV RBC AUTO: 91.3 FL — SIGNIFICANT CHANGE UP (ref 80–100)
MONOCYTES # BLD AUTO: 0.81 K/UL — SIGNIFICANT CHANGE UP (ref 0–0.9)
MONOCYTES NFR BLD AUTO: 8.6 % — SIGNIFICANT CHANGE UP (ref 2–14)
NEUTROPHILS # BLD AUTO: 6.52 K/UL — SIGNIFICANT CHANGE UP (ref 1.8–7.4)
NEUTROPHILS NFR BLD AUTO: 69.5 % — SIGNIFICANT CHANGE UP (ref 43–77)
PLATELET # BLD AUTO: 147 K/UL — LOW (ref 150–400)
RBC # BLD: 2.86 M/UL — LOW (ref 3.8–5.2)
RBC # FLD: 13.4 % — SIGNIFICANT CHANGE UP (ref 10.3–14.5)
WBC # BLD: 9.4 K/UL — SIGNIFICANT CHANGE UP (ref 3.8–10.5)
WBC # FLD AUTO: 9.4 K/UL — SIGNIFICANT CHANGE UP (ref 3.8–10.5)

## 2022-07-02 RX ORDER — ASCORBIC ACID 60 MG
500 TABLET,CHEWABLE ORAL THREE TIMES A DAY
Refills: 0 | Status: DISCONTINUED | OUTPATIENT
Start: 2022-07-02 | End: 2022-07-03

## 2022-07-02 RX ORDER — FERROUS SULFATE 325(65) MG
1 TABLET ORAL
Qty: 30 | Refills: 0
Start: 2022-07-02 | End: 2022-07-31

## 2022-07-02 RX ORDER — IBUPROFEN 200 MG
600 TABLET ORAL EVERY 6 HOURS
Refills: 0 | Status: DISCONTINUED | OUTPATIENT
Start: 2022-07-02 | End: 2022-07-03

## 2022-07-02 RX ORDER — FERROUS SULFATE 325(65) MG
325 TABLET ORAL THREE TIMES A DAY
Refills: 0 | Status: DISCONTINUED | OUTPATIENT
Start: 2022-07-02 | End: 2022-07-03

## 2022-07-02 RX ORDER — OXYCODONE HYDROCHLORIDE 5 MG/1
1 TABLET ORAL
Qty: 4 | Refills: 0
Start: 2022-07-02 | End: 2022-07-03

## 2022-07-02 RX ORDER — IBUPROFEN 200 MG
1 TABLET ORAL
Qty: 28 | Refills: 0
Start: 2022-07-02 | End: 2022-07-08

## 2022-07-02 RX ORDER — ACETAMINOPHEN 500 MG
3 TABLET ORAL
Qty: 36 | Refills: 0
Start: 2022-07-02 | End: 2022-07-04

## 2022-07-02 RX ADMIN — Medication 975 MILLIGRAM(S): at 09:20

## 2022-07-02 RX ADMIN — Medication 975 MILLIGRAM(S): at 03:15

## 2022-07-02 RX ADMIN — Medication 600 MILLIGRAM(S): at 13:20

## 2022-07-02 RX ADMIN — Medication 600 MILLIGRAM(S): at 18:34

## 2022-07-02 RX ADMIN — Medication 975 MILLIGRAM(S): at 16:20

## 2022-07-02 RX ADMIN — Medication 500 MILLIGRAM(S): at 15:15

## 2022-07-02 RX ADMIN — Medication 600 MILLIGRAM(S): at 14:00

## 2022-07-02 RX ADMIN — Medication 600 MILLIGRAM(S): at 17:50

## 2022-07-02 RX ADMIN — Medication 100 MILLIGRAM(S): at 00:20

## 2022-07-02 RX ADMIN — Medication 975 MILLIGRAM(S): at 08:52

## 2022-07-02 RX ADMIN — Medication 975 MILLIGRAM(S): at 21:30

## 2022-07-02 RX ADMIN — Medication 325 MILLIGRAM(S): at 15:16

## 2022-07-02 RX ADMIN — Medication 30 MILLIGRAM(S): at 06:12

## 2022-07-02 RX ADMIN — Medication 975 MILLIGRAM(S): at 15:16

## 2022-07-02 RX ADMIN — Medication 30 MILLIGRAM(S): at 00:14

## 2022-07-02 NOTE — PROGRESS NOTE ADULT - SUBJECTIVE AND OBJECTIVE BOX
Section, POD#1    Pt is now POD#1 S/P repeat  Section with B/L salpingectomy doing well.    She is ambulating without difficulty, tolerating a reg diet, and denies N/V.  No C/O dizziness, no SELWYN.  +Voiding without difficulty.      On exam, pt appears well.  VSS, afebrile.    Vital Signs Last 24 Hrs  T(C): 36.6 (2022 03:40), Max: 36.9 (2022 09:23)  T(F): 97.8 (2022 03:40), Max: 98.4 (2022 09:23)  HR: 82 (2022 03:40) (60 - 84)  BP: 124/65 (2022 03:40) (98/54 - 124/74)  RR: 18 (2022 03:40) (15 - 18)  SpO2: 98% (2022 03:40) (98% - 98%)    Breasts soft, nontender, nonengorged.  Abdomen: Soft, nontender, nondistended , firm uterine fundus.  Incision clean, dry, intact with staples.  Pelvic: Normal lochia noted  Ext: No DVT tenderness    LABS:                        10.6   8.40  )-----------( 181      ( 2022 07:15 )             31.2                     POD#1 S/P rpt  with B/L salpingectomy stable.  Pt recovering well.  Signs and symptoms normal /  abnormal post op courses reviewed.  Plan to cont reg diet, OOB.  Possible D/C tomorrow am pending exam at that time.

## 2022-07-02 NOTE — PROGRESS NOTE ADULT - SUBJECTIVE AND OBJECTIVE BOX
SUKUMAR SANTOS is a 36y  now POD#1 s/p  section with bilateral salpingectomy at 39wks gestation, uncomplicated.    S:    No acute events overnight.   The patient has no complaints.  Pain controlled with current treatment regimen.   She is ambulating without difficulty and tolerating PO.   + flatus/-BM/+ voiding   She endorses appropriate lochia, which is decreasing.   She is both bottle and breastfeeding.  She denies fevers, chills, nausea and vomiting.   She denies lightheadedness, dizziness, palpitations, chest pain and SOB.     O:    T(C): 36.6 (22 @ 03:40), Max: 36.9 (22 @ 09:23)  HR: 82 (22 @ 03:40) (60 - 84)  BP: 124/65 (22 @ 03:40) (98/54 - 124/74)  RR: 18 (22 @ 03:40) (15 - 18)  SpO2: 98% (22 @ 03:40) (98% - 98%)    Gen: NAD, AOx3  CV: RRR, S1/S2 present  Pulm: CTAB   Abdomen:  Soft, non-tender, non-distended, +bowel sounds  Incision: Clean/dry/intact with staples in place. Pressure dressing removed this am.   Uterus:  Fundus firm below umbilicus  VE:  Expected lochia  Ext:  b/l LE non-tender                           10.6   8.40  )-----------( 181      ( 2022 07:15 )             31.2

## 2022-07-02 NOTE — PROGRESS NOTE ADULT - ASSESSMENT
A/P:  SUKUMAR SANTOS is a 36y  now POD#1 s/p  section with bilateral salpingectomy at 39wks gestation, uncomplicated.  -Vital signs stable  -Hgb: 10.6 -> AM labs pending   -Voiding, tolerating PO  -Advance care as tolerated   -Continue routine postpartum and postoperative care and education  -Healthy infant  -DVT ppx: SCDs while in bed  -Dispo: Anticipate discharge tomorrow pending attending approval.

## 2022-07-03 VITALS
HEART RATE: 78 BPM | OXYGEN SATURATION: 99 % | DIASTOLIC BLOOD PRESSURE: 74 MMHG | SYSTOLIC BLOOD PRESSURE: 116 MMHG | RESPIRATION RATE: 18 BRPM | TEMPERATURE: 98 F

## 2022-07-03 PROCEDURE — U0003: CPT

## 2022-07-03 PROCEDURE — 87389 HIV-1 AG W/HIV-1&-2 AB AG IA: CPT

## 2022-07-03 PROCEDURE — 86703 HIV-1/HIV-2 1 RESULT ANTBDY: CPT

## 2022-07-03 PROCEDURE — 86769 SARS-COV-2 COVID-19 ANTIBODY: CPT

## 2022-07-03 PROCEDURE — 86780 TREPONEMA PALLIDUM: CPT

## 2022-07-03 PROCEDURE — 36415 COLL VENOUS BLD VENIPUNCTURE: CPT

## 2022-07-03 PROCEDURE — 59050 FETAL MONITOR W/REPORT: CPT

## 2022-07-03 PROCEDURE — U0005: CPT

## 2022-07-03 PROCEDURE — G0463: CPT

## 2022-07-03 PROCEDURE — 59025 FETAL NON-STRESS TEST: CPT

## 2022-07-03 PROCEDURE — 86900 BLOOD TYPING SEROLOGIC ABO: CPT

## 2022-07-03 PROCEDURE — 86901 BLOOD TYPING SEROLOGIC RH(D): CPT

## 2022-07-03 PROCEDURE — 85025 COMPLETE CBC W/AUTO DIFF WBC: CPT

## 2022-07-03 PROCEDURE — 86850 RBC ANTIBODY SCREEN: CPT

## 2022-07-03 RX ADMIN — Medication 975 MILLIGRAM(S): at 08:37

## 2022-07-03 RX ADMIN — Medication 975 MILLIGRAM(S): at 03:21

## 2022-07-03 RX ADMIN — Medication 325 MILLIGRAM(S): at 08:36

## 2022-07-03 RX ADMIN — Medication 600 MILLIGRAM(S): at 06:12

## 2022-07-03 RX ADMIN — Medication 600 MILLIGRAM(S): at 00:04

## 2022-07-03 RX ADMIN — Medication 600 MILLIGRAM(S): at 11:41

## 2022-07-03 RX ADMIN — SIMETHICONE 80 MILLIGRAM(S): 80 TABLET, CHEWABLE ORAL at 08:36

## 2022-07-03 RX ADMIN — Medication 500 MILLIGRAM(S): at 08:36

## 2022-07-03 RX ADMIN — Medication 975 MILLIGRAM(S): at 10:00

## 2022-07-03 NOTE — PROGRESS NOTE ADULT - SUBJECTIVE AND OBJECTIVE BOX
SUKUMAR SANTOS is a 36y  now POD#2 s/p repeat  section at 39 weeks gestation, uncomplicated.    S:    No acute events overnight.   The patient has no complaints.  Pain controlled with current treatment regimen.   She is ambulating without difficulty and tolerating PO.   + flatus/+BM/+ voiding   She endorses appropriate lochia, which is decreasing.   She is breastfeeding without difficulty.   She denies fevers, chills, nausea and vomiting.   She denies lightheadedness, dizziness, palpitations, chest pain and SOB.     O:    T(C): 36.8 (22 @ 04:10), Max: 36.9 (22 @ 07:45)  HR: 78 (22 @ 04:10) (78 - 84)  BP: 116/74 (22 @ 04:10) (108/70 - 123/79)  RR: 18 (22 @ 04:10) (18 - 18)  SpO2: 99% (22 @ 04:10) (98% - 100%)    Gen: NAD, AOx3  Abdomen:  Soft, non-tender, non-distended, +bowel sounds  Incision: Clean/dry/intact with staples in place   Uterus:  Fundus firm below umbilicus  VE:  Expected lochia  Ext:  b/l LE non-tender                           8.7    9.40  )-----------( 147      ( 2022 07:42 )             26.1              SUKUMAR SANTOS is a 36y  now POD#2 s/p repeat  section at 39 weeks gestation, uncomplicated.    S:    No acute events overnight.   The patient has no complaints.  Pain controlled with current treatment regimen.   She is ambulating without difficulty and tolerating PO.   + flatus/+BM/+ voiding   She endorses appropriate lochia, which is decreasing.   She is breastfeeding without difficulty.   She denies fevers, chills, nausea and vomiting.   She denies lightheadedness, dizziness, palpitations, chest pain and SOB.     O:    T(C): 36.8 (22 @ 04:10), Max: 36.9 (22 @ 07:45)  HR: 78 (22 @ 04:10) (78 - 84)  BP: 116/74 (22 @ 04:10) (108/70 - 123/79)  RR: 18 (22 @ 04:10) (18 - 18)  SpO2: 99% (22 @ 04:10) (98% - 100%)    Gen: NAD, AOx3  Abdomen:  Soft, non-tender, non-distended,  Incision: Clean/dry/intact with staples in place   Uterus:  Fundus firm below umbilicus  VE:  Expected lochia  Ext:  b/l LE non-tender                           8.7    9.40  )-----------( 147      ( 2022 07:42 )             26.1

## 2022-07-03 NOTE — PROGRESS NOTE ADULT - ASSESSMENT
A/P:    -Vital signs stable  -Hgb: 8.7 -> AM labs pending   -Voiding, tolerating PO  -Advance care as tolerated   -Continue routine postpartum and postoperative care and education  -Healthy male infant, desires circumcision  -Dispo: Anticipate discharge  pending attending approval.   A/P:  SUKUMAR SANTOS is a 36y  now POD#2 s/p repeat  section at 39 weeks gestation, uncomplicated.  -Vital signs stable  -Hgb: 8.7 -> AM labs pending   -Voiding, tolerating PO  -Advance care as tolerated   -Continue routine postpartum and postoperative care and education  -Healthy male infant, desires circumcision  -Dispo: Anticipate discharge  pending attending approval.

## 2022-07-14 LAB — SURGICAL PATHOLOGY STUDY: SIGNIFICANT CHANGE UP

## 2022-08-01 ENCOUNTER — APPOINTMENT (OUTPATIENT)
Dept: DERMATOLOGY | Facility: CLINIC | Age: 37
End: 2022-08-01

## 2022-08-01 PROCEDURE — 99213 OFFICE O/P EST LOW 20 MIN: CPT

## 2022-08-18 ENCOUNTER — RESULT REVIEW (OUTPATIENT)
Age: 37
End: 2022-08-18

## 2022-09-30 NOTE — OB PST NOTE - NS_FETALANOMAL_OBGYN_ALL_OB
Health Maintenance Due   Topic Date Due   • Hepatitis B Vaccine (1 of 3 - 3-dose series) Never done   • Traditional Medicare- Medicare Wellness Visit  12/18/2021   • COVID-19 Vaccine (4 - Booster for Moderna series) 04/09/2022   • DTaP/Tdap/Td Vaccine (2 - Td or Tdap) 05/29/2022   • Influenza Vaccine (1) 09/01/2022       Patient is due for topics as listed above but is not proceeding with Immunization(s) COVID-19, Dtap/Tdap/Td, Hep B and Influenza at this time. Education provided for Immunization(s) COVID-19, Hep B and Influenza.   No

## 2022-11-19 NOTE — OB RN PATIENT PROFILE - HEART RATE (BEATS/MIN)
Patient examined and found to be fully dilated/+1 station. Directed to push with contractions. Delivered a viable male . Cord clamped and cut after 60 seconds. Placenta delivered spontaneously and intact. Fundal massage given, uterus well contracted. A small right labial laceration and a vaginal abrasion were repaired with 2-0 vicryl. Hemostasis noted. 100

## 2023-04-12 NOTE — OB RN INTRAOPERATIVE NOTE - NS_DECISIONCESAREAN_OBGYN_ALL_OB_DT
Benefits, risks, and possible complications of procedure explained to patient/caregiver who verbalized understanding and gave verbal consent. 01-Jul-2022 08:02

## 2023-08-07 ENCOUNTER — APPOINTMENT (OUTPATIENT)
Dept: DERMATOLOGY | Facility: CLINIC | Age: 38
End: 2023-08-07
Payer: COMMERCIAL

## 2023-08-07 PROCEDURE — 99213 OFFICE O/P EST LOW 20 MIN: CPT

## 2023-09-18 ENCOUNTER — APPOINTMENT (OUTPATIENT)
Dept: DERMATOLOGY | Facility: CLINIC | Age: 38
End: 2023-09-18
Payer: COMMERCIAL

## 2023-09-18 PROCEDURE — 11102 TANGNTL BX SKIN SINGLE LES: CPT

## 2023-10-16 ENCOUNTER — RX ONLY (RX ONLY)
Age: 38
End: 2023-10-16

## 2023-10-16 ENCOUNTER — OFFICE (OUTPATIENT)
Dept: URBAN - METROPOLITAN AREA CLINIC 113 | Facility: CLINIC | Age: 38
Setting detail: OPHTHALMOLOGY
End: 2023-10-16
Payer: COMMERCIAL

## 2023-10-16 DIAGNOSIS — H01.001: ICD-10-CM

## 2023-10-16 DIAGNOSIS — H52.7: ICD-10-CM

## 2023-10-16 DIAGNOSIS — D31.30: ICD-10-CM

## 2023-10-16 DIAGNOSIS — H16.223: ICD-10-CM

## 2023-10-16 DIAGNOSIS — H01.004: ICD-10-CM

## 2023-10-16 PROCEDURE — 92250 FUNDUS PHOTOGRAPHY W/I&R: CPT | Performed by: STUDENT IN AN ORGANIZED HEALTH CARE EDUCATION/TRAINING PROGRAM

## 2023-10-16 PROCEDURE — 92015 DETERMINE REFRACTIVE STATE: CPT | Performed by: STUDENT IN AN ORGANIZED HEALTH CARE EDUCATION/TRAINING PROGRAM

## 2023-10-16 PROCEDURE — 92004 COMPRE OPH EXAM NEW PT 1/>: CPT | Performed by: STUDENT IN AN ORGANIZED HEALTH CARE EDUCATION/TRAINING PROGRAM

## 2023-10-16 ASSESSMENT — REFRACTION_AUTOREFRACTION
OS_SPHERE: +0.25
OS_AXIS: 102
OD_CYLINDER: -1.00
OD_AXIS: 097
OD_SPHERE: -0.25
OS_CYLINDER: -1.25

## 2023-10-16 ASSESSMENT — SUPERFICIAL PUNCTATE KERATITIS (SPK)
OD_SPK: T
OS_SPK: T

## 2023-10-16 ASSESSMENT — VISUAL ACUITY
OD_BCVA: 20/30+1
OS_BCVA: 20/25+1

## 2023-10-16 ASSESSMENT — TONOMETRY
OS_IOP_MMHG: 18
OD_IOP_MMHG: 19

## 2023-10-16 ASSESSMENT — KERATOMETRY
OS_K1POWER_DIOPTERS: 42.50
OD_K2POWER_DIOPTERS: 43.25
OD_AXISANGLE_DEGREES: 038
OS_K2POWER_DIOPTERS: 43.25
OS_AXISANGLE_DEGREES: 166
OD_K1POWER_DIOPTERS: 42.50

## 2023-10-16 ASSESSMENT — REFRACTION_MANIFEST
OD_CYLINDER: -1.00
OS_CYLINDER: -1.25
OD_SPHERE: PLANO
OS_VA1: 20/20
OS_SPHERE: PLANO
OD_VA1: 20/20
OD_AXIS: 097
OS_AXIS: 102

## 2023-10-16 ASSESSMENT — AXIALLENGTH_DERIVED
OS_AL: 23.9737
OD_AL: 24.1255

## 2023-10-16 ASSESSMENT — LID EXAM ASSESSMENTS
OD_BLEPHARITIS: RUL T
OS_BLEPHARITIS: LUL T

## 2023-10-16 ASSESSMENT — SPHEQUIV_DERIVED
OD_SPHEQUIV: -0.75
OS_SPHEQUIV: -0.375

## 2023-10-16 ASSESSMENT — CONFRONTATIONAL VISUAL FIELD TEST (CVF)
OD_FINDINGS: FULL
OS_FINDINGS: FULL

## 2024-05-07 NOTE — OB PROVIDER H&P - PRO HIV INFANT
[TextEntry] : Gen: NAD, well-appearing HEENT: Supple neck, MMM Pulm: CTA CV: RRR, no rub Back: No spinal or CVA tenderness Abd: +BS, soft, nontender/nondistended LE: Warm, no edema Neuro: No focal deficits Psych: Normal affect Skin: Warm, without rashes    negative

## 2024-08-07 NOTE — PROGRESS NOTE ADULT - PROBLEM/PLAN-1
What Type Of Note Output Would You Prefer (Optional)?: Standard Output Hpi Title: Evaluation of Skin Lesions DISPLAY PLAN FREE TEXT

## 2024-10-21 ENCOUNTER — OFFICE (OUTPATIENT)
Dept: URBAN - METROPOLITAN AREA CLINIC 113 | Facility: CLINIC | Age: 39
Setting detail: OPHTHALMOLOGY
End: 2024-10-21
Payer: COMMERCIAL

## 2024-10-21 DIAGNOSIS — H01.004: ICD-10-CM

## 2024-10-21 DIAGNOSIS — H16.223: ICD-10-CM

## 2024-10-21 DIAGNOSIS — D31.30: ICD-10-CM

## 2024-10-21 DIAGNOSIS — H01.001: ICD-10-CM

## 2024-10-21 PROCEDURE — 92014 COMPRE OPH EXAM EST PT 1/>: CPT | Performed by: STUDENT IN AN ORGANIZED HEALTH CARE EDUCATION/TRAINING PROGRAM

## 2024-10-21 PROCEDURE — 92250 FUNDUS PHOTOGRAPHY W/I&R: CPT | Performed by: STUDENT IN AN ORGANIZED HEALTH CARE EDUCATION/TRAINING PROGRAM

## 2024-10-21 ASSESSMENT — VISUAL ACUITY
OD_BCVA: 20/25+
OS_BCVA: 20/20-

## 2024-10-21 ASSESSMENT — KERATOMETRY
OS_K1POWER_DIOPTERS: 43.00
OD_K2POWER_DIOPTERS: 43.25
OS_K2POWER_DIOPTERS: 43.50
OS_AXISANGLE_DEGREES: 165
OD_K1POWER_DIOPTERS: 42.50
OD_AXISANGLE_DEGREES: 038

## 2024-10-21 ASSESSMENT — TONOMETRY
OS_IOP_MMHG: 19
OD_IOP_MMHG: 19

## 2024-10-21 ASSESSMENT — REFRACTION_MANIFEST
OD_VA1: 20/20
OD_AXIS: 097
OD_SPHERE: PLANO
OS_SPHERE: PLANO
OS_VA1: 20/20
OS_CYLINDER: -1.25
OS_AXIS: 102
OD_CYLINDER: -1.00

## 2024-10-21 ASSESSMENT — SUPERFICIAL PUNCTATE KERATITIS (SPK)
OD_SPK: T
OS_SPK: T

## 2024-10-21 ASSESSMENT — REFRACTION_AUTOREFRACTION
OS_AXIS: 102
OD_CYLINDER: -1.00
OS_CYLINDER: -1.25
OD_AXIS: 097
OD_SPHERE: PLANO
OS_SPHERE: +0.25

## 2024-10-21 ASSESSMENT — LID EXAM ASSESSMENTS
OD_BLEPHARITIS: RUL T
OS_BLEPHARITIS: LUL T

## 2024-10-21 ASSESSMENT — CONFRONTATIONAL VISUAL FIELD TEST (CVF)
OD_FINDINGS: FULL
OS_FINDINGS: FULL